# Patient Record
Sex: MALE | Race: ASIAN | Employment: FULL TIME | URBAN - METROPOLITAN AREA
[De-identification: names, ages, dates, MRNs, and addresses within clinical notes are randomized per-mention and may not be internally consistent; named-entity substitution may affect disease eponyms.]

---

## 2018-10-17 ENCOUNTER — APPOINTMENT (OUTPATIENT)
Dept: GENERAL RADIOLOGY | Facility: CLINIC | Age: 43
End: 2018-10-17
Attending: EMERGENCY MEDICINE
Payer: COMMERCIAL

## 2018-10-17 ENCOUNTER — HOSPITAL ENCOUNTER (INPATIENT)
Facility: CLINIC | Age: 43
LOS: 3 days | Discharge: HOME | End: 2018-10-20
Attending: HOSPITALIST | Admitting: INTERNAL MEDICINE
Payer: COMMERCIAL

## 2018-10-17 ENCOUNTER — HOSPITAL ENCOUNTER (EMERGENCY)
Facility: CLINIC | Age: 43
Discharge: ANOTHER HOSPITAL | End: 2018-10-17
Attending: EMERGENCY MEDICINE | Admitting: EMERGENCY MEDICINE
Payer: COMMERCIAL

## 2018-10-17 ENCOUNTER — APPOINTMENT (OUTPATIENT)
Dept: CARDIOLOGY | Facility: CLINIC | Age: 43
End: 2018-10-17
Attending: INTERNAL MEDICINE
Payer: COMMERCIAL

## 2018-10-17 VITALS
RESPIRATION RATE: 17 BRPM | HEIGHT: 65 IN | BODY MASS INDEX: 25.99 KG/M2 | OXYGEN SATURATION: 100 % | TEMPERATURE: 98.6 F | WEIGHT: 156 LBS | HEART RATE: 76 BPM | SYSTOLIC BLOOD PRESSURE: 107 MMHG | DIASTOLIC BLOOD PRESSURE: 63 MMHG

## 2018-10-17 DIAGNOSIS — I24.9 ACS (ACUTE CORONARY SYNDROME) (H): ICD-10-CM

## 2018-10-17 DIAGNOSIS — I21.4 NSTEMI (NON-ST ELEVATED MYOCARDIAL INFARCTION) (H): ICD-10-CM

## 2018-10-17 DIAGNOSIS — I23.8 PERICARDITIS AS COMPLICATION OF ACUTE MYOCARDIAL INFARCTION (H): ICD-10-CM

## 2018-10-17 DIAGNOSIS — I31.9 PERICARDITIS AS COMPLICATION OF ACUTE MYOCARDIAL INFARCTION (H): ICD-10-CM

## 2018-10-17 DIAGNOSIS — Z98.61 STATUS POST PERCUTANEOUS TRANSLUMINAL CORONARY ANGIOPLASTY: Primary | ICD-10-CM

## 2018-10-17 PROBLEM — I21.19: Status: ACTIVE | Noted: 2018-10-17

## 2018-10-17 PROBLEM — E74.39 GLUCOSE INTOLERANCE: Status: ACTIVE | Noted: 2018-10-17

## 2018-10-17 LAB
ANION GAP SERPL CALCULATED.3IONS-SCNC: 8 MMOL/L (ref 3–14)
BASOPHILS # BLD AUTO: 0 10E9/L (ref 0–0.2)
BASOPHILS NFR BLD AUTO: 0.2 %
BUN SERPL-MCNC: 14 MG/DL (ref 7–30)
CALCIUM SERPL-MCNC: 8.6 MG/DL (ref 8.5–10.1)
CHLORIDE SERPL-SCNC: 105 MMOL/L (ref 94–109)
CO2 SERPL-SCNC: 25 MMOL/L (ref 20–32)
CREAT SERPL-MCNC: 1.22 MG/DL (ref 0.66–1.25)
DIFFERENTIAL METHOD BLD: ABNORMAL
EOSINOPHIL # BLD AUTO: 0.1 10E9/L (ref 0–0.7)
EOSINOPHIL NFR BLD AUTO: 0.4 %
ERYTHROCYTE [DISTWIDTH] IN BLOOD BY AUTOMATED COUNT: 13.1 % (ref 10–15)
GFR SERPL CREATININE-BSD FRML MDRD: 64 ML/MIN/1.7M2
GLUCOSE BLDC GLUCOMTR-MCNC: 115 MG/DL (ref 70–99)
GLUCOSE BLDC GLUCOMTR-MCNC: 128 MG/DL (ref 70–99)
GLUCOSE BLDC GLUCOMTR-MCNC: 88 MG/DL (ref 70–99)
GLUCOSE SERPL-MCNC: 126 MG/DL (ref 70–99)
HBA1C MFR BLD: 5.9 % (ref 0–5.6)
HCT VFR BLD AUTO: 43.8 % (ref 40–53)
HGB BLD-MCNC: 14.7 G/DL (ref 13.3–17.7)
IMM GRANULOCYTES # BLD: 0 10E9/L (ref 0–0.4)
IMM GRANULOCYTES NFR BLD: 0.3 %
INTERPRETATION ECG - MUSE: NORMAL
LMWH PPP CHRO-ACNC: 0.31 IU/ML
LYMPHOCYTES # BLD AUTO: 1.3 10E9/L (ref 0.8–5.3)
LYMPHOCYTES NFR BLD AUTO: 11.2 %
MAGNESIUM SERPL-MCNC: 2.1 MG/DL (ref 1.6–2.3)
MCH RBC QN AUTO: 29.5 PG (ref 26.5–33)
MCHC RBC AUTO-ENTMCNC: 33.6 G/DL (ref 31.5–36.5)
MCV RBC AUTO: 88 FL (ref 78–100)
MONOCYTES # BLD AUTO: 0.8 10E9/L (ref 0–1.3)
MONOCYTES NFR BLD AUTO: 6.8 %
NEUTROPHILS # BLD AUTO: 9.7 10E9/L (ref 1.6–8.3)
NEUTROPHILS NFR BLD AUTO: 81.1 %
NRBC # BLD AUTO: 0 10*3/UL
NRBC BLD AUTO-RTO: 0 /100
PHOSPHATE SERPL-MCNC: 2.7 MG/DL (ref 2.5–4.5)
PLATELET # BLD AUTO: 236 10E9/L (ref 150–450)
POTASSIUM SERPL-SCNC: 3.9 MMOL/L (ref 3.4–5.3)
RBC # BLD AUTO: 4.98 10E12/L (ref 4.4–5.9)
SODIUM SERPL-SCNC: 138 MMOL/L (ref 133–144)
TROPONIN I SERPL-MCNC: 72.06 UG/L (ref 0–0.04)
TROPONIN I SERPL-MCNC: 83.22 UG/L (ref 0–0.04)
TROPONIN I SERPL-MCNC: 96.92 UG/L (ref 0–0.04)
WBC # BLD AUTO: 11.9 10E9/L (ref 4–11)

## 2018-10-17 PROCEDURE — 84100 ASSAY OF PHOSPHORUS: CPT | Performed by: NURSE PRACTITIONER

## 2018-10-17 PROCEDURE — 99221 1ST HOSP IP/OBS SF/LOW 40: CPT | Mod: AI | Performed by: INTERNAL MEDICINE

## 2018-10-17 PROCEDURE — 85520 HEPARIN ASSAY: CPT | Performed by: INTERNAL MEDICINE

## 2018-10-17 PROCEDURE — 83036 HEMOGLOBIN GLYCOSYLATED A1C: CPT | Performed by: NURSE PRACTITIONER

## 2018-10-17 PROCEDURE — 83735 ASSAY OF MAGNESIUM: CPT | Performed by: NURSE PRACTITIONER

## 2018-10-17 PROCEDURE — 96374 THER/PROPH/DIAG INJ IV PUSH: CPT

## 2018-10-17 PROCEDURE — 93005 ELECTROCARDIOGRAM TRACING: CPT | Mod: 76

## 2018-10-17 PROCEDURE — 25000132 ZZH RX MED GY IP 250 OP 250 PS 637: Performed by: INTERNAL MEDICINE

## 2018-10-17 PROCEDURE — 99223 1ST HOSP IP/OBS HIGH 75: CPT | Performed by: INTERNAL MEDICINE

## 2018-10-17 PROCEDURE — 99285 EMERGENCY DEPT VISIT HI MDM: CPT

## 2018-10-17 PROCEDURE — 71046 X-RAY EXAM CHEST 2 VIEWS: CPT

## 2018-10-17 PROCEDURE — 36415 COLL VENOUS BLD VENIPUNCTURE: CPT | Performed by: NURSE PRACTITIONER

## 2018-10-17 PROCEDURE — 25000132 ZZH RX MED GY IP 250 OP 250 PS 637: Performed by: EMERGENCY MEDICINE

## 2018-10-17 PROCEDURE — 93306 TTE W/DOPPLER COMPLETE: CPT | Mod: 26 | Performed by: INTERNAL MEDICINE

## 2018-10-17 PROCEDURE — 93005 ELECTROCARDIOGRAM TRACING: CPT

## 2018-10-17 PROCEDURE — 84484 ASSAY OF TROPONIN QUANT: CPT | Performed by: NURSE PRACTITIONER

## 2018-10-17 PROCEDURE — 84484 ASSAY OF TROPONIN QUANT: CPT | Performed by: EMERGENCY MEDICINE

## 2018-10-17 PROCEDURE — 99207 ZZC APP CREDIT; MD BILLING SHARED VISIT: CPT | Performed by: NURSE PRACTITIONER

## 2018-10-17 PROCEDURE — 85025 COMPLETE CBC W/AUTO DIFF WBC: CPT | Performed by: EMERGENCY MEDICINE

## 2018-10-17 PROCEDURE — 93306 TTE W/DOPPLER COMPLETE: CPT

## 2018-10-17 PROCEDURE — 99207 ZZC DOWN CODE DUE TO INITIAL EXAM: CPT | Performed by: INTERNAL MEDICINE

## 2018-10-17 PROCEDURE — 00000146 ZZHCL STATISTIC GLUCOSE BY METER IP

## 2018-10-17 PROCEDURE — 21000001 ZZH R&B HEART CARE

## 2018-10-17 PROCEDURE — 25000128 H RX IP 250 OP 636: Performed by: EMERGENCY MEDICINE

## 2018-10-17 PROCEDURE — 25000132 ZZH RX MED GY IP 250 OP 250 PS 637: Performed by: NURSE PRACTITIONER

## 2018-10-17 PROCEDURE — 25000128 H RX IP 250 OP 636: Performed by: NURSE PRACTITIONER

## 2018-10-17 PROCEDURE — 80048 BASIC METABOLIC PNL TOTAL CA: CPT | Performed by: EMERGENCY MEDICINE

## 2018-10-17 RX ORDER — POTASSIUM CHLORIDE 1500 MG/1
20-40 TABLET, EXTENDED RELEASE ORAL
Status: DISCONTINUED | OUTPATIENT
Start: 2018-10-17 | End: 2018-10-20 | Stop reason: HOSPADM

## 2018-10-17 RX ORDER — POTASSIUM CHLORIDE 29.8 MG/ML
20 INJECTION INTRAVENOUS
Status: DISCONTINUED | OUTPATIENT
Start: 2018-10-17 | End: 2018-10-20 | Stop reason: HOSPADM

## 2018-10-17 RX ORDER — ATORVASTATIN CALCIUM 40 MG/1
40 TABLET, FILM COATED ORAL EVERY EVENING
Status: DISCONTINUED | OUTPATIENT
Start: 2018-10-17 | End: 2018-10-20

## 2018-10-17 RX ORDER — ASPIRIN 81 MG/1
324 TABLET, CHEWABLE ORAL ONCE
Status: COMPLETED | OUTPATIENT
Start: 2018-10-17 | End: 2018-10-17

## 2018-10-17 RX ORDER — METOCLOPRAMIDE 5 MG/1
10 TABLET ORAL EVERY 6 HOURS PRN
Status: DISCONTINUED | OUTPATIENT
Start: 2018-10-17 | End: 2018-10-20 | Stop reason: HOSPADM

## 2018-10-17 RX ORDER — LIDOCAINE 40 MG/G
CREAM TOPICAL
Status: DISCONTINUED | OUTPATIENT
Start: 2018-10-17 | End: 2018-10-17

## 2018-10-17 RX ORDER — POTASSIUM CHLORIDE 1.5 G/1.58G
20-40 POWDER, FOR SOLUTION ORAL
Status: DISCONTINUED | OUTPATIENT
Start: 2018-10-17 | End: 2018-10-20 | Stop reason: HOSPADM

## 2018-10-17 RX ORDER — POLYETHYLENE GLYCOL 3350 17 G/17G
17 POWDER, FOR SOLUTION ORAL DAILY PRN
Status: DISCONTINUED | OUTPATIENT
Start: 2018-10-17 | End: 2018-10-20 | Stop reason: HOSPADM

## 2018-10-17 RX ORDER — CALCIUM CARBONATE 500 MG/1
1000 TABLET, CHEWABLE ORAL 4 TIMES DAILY PRN
Status: DISCONTINUED | OUTPATIENT
Start: 2018-10-17 | End: 2018-10-20 | Stop reason: HOSPADM

## 2018-10-17 RX ORDER — MORPHINE SULFATE 2 MG/ML
1 INJECTION, SOLUTION INTRAMUSCULAR; INTRAVENOUS
Status: DISCONTINUED | OUTPATIENT
Start: 2018-10-17 | End: 2018-10-20 | Stop reason: HOSPADM

## 2018-10-17 RX ORDER — MAGNESIUM SULFATE HEPTAHYDRATE 40 MG/ML
4 INJECTION, SOLUTION INTRAVENOUS EVERY 4 HOURS PRN
Status: DISCONTINUED | OUTPATIENT
Start: 2018-10-17 | End: 2018-10-20 | Stop reason: HOSPADM

## 2018-10-17 RX ORDER — ASPIRIN 325 MG
325 TABLET ORAL DAILY
Status: DISCONTINUED | OUTPATIENT
Start: 2018-10-18 | End: 2018-10-17

## 2018-10-17 RX ORDER — POTASSIUM CL/LIDO/0.9 % NACL 10MEQ/0.1L
10 INTRAVENOUS SOLUTION, PIGGYBACK (ML) INTRAVENOUS
Status: DISCONTINUED | OUTPATIENT
Start: 2018-10-17 | End: 2018-10-20 | Stop reason: HOSPADM

## 2018-10-17 RX ORDER — NICOTINE POLACRILEX 4 MG
15-30 LOZENGE BUCCAL
Status: DISCONTINUED | OUTPATIENT
Start: 2018-10-17 | End: 2018-10-20 | Stop reason: HOSPADM

## 2018-10-17 RX ORDER — ACETAMINOPHEN 325 MG/1
650 TABLET ORAL EVERY 4 HOURS PRN
Status: DISCONTINUED | OUTPATIENT
Start: 2018-10-17 | End: 2018-10-19

## 2018-10-17 RX ORDER — DEXTROSE MONOHYDRATE 25 G/50ML
25-50 INJECTION, SOLUTION INTRAVENOUS
Status: DISCONTINUED | OUTPATIENT
Start: 2018-10-17 | End: 2018-10-20 | Stop reason: HOSPADM

## 2018-10-17 RX ORDER — SODIUM CHLORIDE 9 MG/ML
INJECTION, SOLUTION INTRAVENOUS CONTINUOUS
Status: DISCONTINUED | OUTPATIENT
Start: 2018-10-18 | End: 2018-10-18 | Stop reason: HOSPADM

## 2018-10-17 RX ORDER — METOCLOPRAMIDE HYDROCHLORIDE 5 MG/ML
10 INJECTION INTRAMUSCULAR; INTRAVENOUS EVERY 6 HOURS PRN
Status: DISCONTINUED | OUTPATIENT
Start: 2018-10-17 | End: 2018-10-20 | Stop reason: HOSPADM

## 2018-10-17 RX ORDER — PROCHLORPERAZINE 25 MG
25 SUPPOSITORY, RECTAL RECTAL EVERY 12 HOURS PRN
Status: DISCONTINUED | OUTPATIENT
Start: 2018-10-17 | End: 2018-10-20 | Stop reason: HOSPADM

## 2018-10-17 RX ORDER — LIDOCAINE 40 MG/G
CREAM TOPICAL
Status: DISCONTINUED | OUTPATIENT
Start: 2018-10-17 | End: 2018-10-20 | Stop reason: HOSPADM

## 2018-10-17 RX ORDER — MAGNESIUM SULFATE HEPTAHYDRATE 40 MG/ML
2 INJECTION, SOLUTION INTRAVENOUS DAILY PRN
Status: DISCONTINUED | OUTPATIENT
Start: 2018-10-17 | End: 2018-10-20 | Stop reason: HOSPADM

## 2018-10-17 RX ORDER — ATORVASTATIN CALCIUM 10 MG/1
10 TABLET, FILM COATED ORAL EVERY EVENING
Status: ON HOLD | COMMUNITY
End: 2018-10-20

## 2018-10-17 RX ORDER — PROCHLORPERAZINE MALEATE 5 MG
10 TABLET ORAL EVERY 6 HOURS PRN
Status: DISCONTINUED | OUTPATIENT
Start: 2018-10-17 | End: 2018-10-20 | Stop reason: HOSPADM

## 2018-10-17 RX ORDER — AMOXICILLIN 250 MG
1 CAPSULE ORAL 2 TIMES DAILY PRN
Status: DISCONTINUED | OUTPATIENT
Start: 2018-10-17 | End: 2018-10-20 | Stop reason: HOSPADM

## 2018-10-17 RX ORDER — AMOXICILLIN 250 MG
2 CAPSULE ORAL 2 TIMES DAILY PRN
Status: DISCONTINUED | OUTPATIENT
Start: 2018-10-17 | End: 2018-10-20 | Stop reason: HOSPADM

## 2018-10-17 RX ORDER — ACETAMINOPHEN 650 MG/1
650 SUPPOSITORY RECTAL EVERY 4 HOURS PRN
Status: DISCONTINUED | OUTPATIENT
Start: 2018-10-17 | End: 2018-10-20 | Stop reason: HOSPADM

## 2018-10-17 RX ORDER — NALOXONE HYDROCHLORIDE 0.4 MG/ML
.1-.4 INJECTION, SOLUTION INTRAMUSCULAR; INTRAVENOUS; SUBCUTANEOUS
Status: DISCONTINUED | OUTPATIENT
Start: 2018-10-17 | End: 2018-10-20 | Stop reason: HOSPADM

## 2018-10-17 RX ORDER — POTASSIUM CHLORIDE 7.45 MG/ML
10 INJECTION INTRAVENOUS
Status: DISCONTINUED | OUTPATIENT
Start: 2018-10-17 | End: 2018-10-20 | Stop reason: HOSPADM

## 2018-10-17 RX ORDER — NITROGLYCERIN 0.4 MG/1
0.4 TABLET SUBLINGUAL EVERY 5 MIN PRN
Status: DISCONTINUED | OUTPATIENT
Start: 2018-10-17 | End: 2018-10-17 | Stop reason: HOSPADM

## 2018-10-17 RX ORDER — ALUMINA, MAGNESIA, AND SIMETHICONE 2400; 2400; 240 MG/30ML; MG/30ML; MG/30ML
30 SUSPENSION ORAL EVERY 4 HOURS PRN
Status: DISCONTINUED | OUTPATIENT
Start: 2018-10-17 | End: 2018-10-20 | Stop reason: HOSPADM

## 2018-10-17 RX ORDER — POTASSIUM CHLORIDE 1500 MG/1
20 TABLET, EXTENDED RELEASE ORAL
Status: DISCONTINUED | OUTPATIENT
Start: 2018-10-17 | End: 2018-10-18 | Stop reason: HOSPADM

## 2018-10-17 RX ORDER — ONDANSETRON 2 MG/ML
4 INJECTION INTRAMUSCULAR; INTRAVENOUS EVERY 6 HOURS PRN
Status: DISCONTINUED | OUTPATIENT
Start: 2018-10-17 | End: 2018-10-20 | Stop reason: HOSPADM

## 2018-10-17 RX ORDER — LORAZEPAM 0.5 MG/1
0.5 TABLET ORAL
Status: DISCONTINUED | OUTPATIENT
Start: 2018-10-17 | End: 2018-10-18 | Stop reason: HOSPADM

## 2018-10-17 RX ORDER — LORAZEPAM 2 MG/ML
0.5 INJECTION INTRAMUSCULAR
Status: DISCONTINUED | OUTPATIENT
Start: 2018-10-17 | End: 2018-10-18 | Stop reason: HOSPADM

## 2018-10-17 RX ORDER — FUROSEMIDE 10 MG/ML
20 INJECTION INTRAMUSCULAR; INTRAVENOUS EVERY 12 HOURS
Status: DISCONTINUED | OUTPATIENT
Start: 2018-10-17 | End: 2018-10-18 | Stop reason: ALTCHOICE

## 2018-10-17 RX ORDER — ONDANSETRON 4 MG/1
4 TABLET, ORALLY DISINTEGRATING ORAL EVERY 6 HOURS PRN
Status: DISCONTINUED | OUTPATIENT
Start: 2018-10-17 | End: 2018-10-20 | Stop reason: HOSPADM

## 2018-10-17 RX ADMIN — POTASSIUM CHLORIDE 20 MEQ: 1500 TABLET, EXTENDED RELEASE ORAL at 21:22

## 2018-10-17 RX ADMIN — HEPARIN SODIUM 800 UNITS/HR: 10000 INJECTION, SOLUTION INTRAVENOUS at 07:56

## 2018-10-17 RX ADMIN — Medication 3900 UNITS: at 07:58

## 2018-10-17 RX ADMIN — ASPIRIN 81 MG 324 MG: 81 TABLET ORAL at 05:36

## 2018-10-17 RX ADMIN — ASPIRIN 325 MG: 325 TABLET, DELAYED RELEASE ORAL at 12:23

## 2018-10-17 RX ADMIN — ACETAMINOPHEN 650 MG: 325 TABLET, FILM COATED ORAL at 11:08

## 2018-10-17 RX ADMIN — SODIUM CHLORIDE 500 ML: 9 INJECTION, SOLUTION INTRAVENOUS at 07:25

## 2018-10-17 RX ADMIN — FUROSEMIDE 20 MG: 10 INJECTION, SOLUTION INTRAVENOUS at 11:03

## 2018-10-17 RX ADMIN — ATORVASTATIN CALCIUM 40 MG: 40 TABLET, FILM COATED ORAL at 21:22

## 2018-10-17 RX ADMIN — HEPARIN SODIUM 800 UNITS/HR: 10000 INJECTION, SOLUTION INTRAVENOUS at 07:57

## 2018-10-17 RX ADMIN — ACETAMINOPHEN 650 MG: 325 TABLET, FILM COATED ORAL at 21:33

## 2018-10-17 RX ADMIN — ACETAMINOPHEN 650 MG: 325 TABLET, FILM COATED ORAL at 15:54

## 2018-10-17 ASSESSMENT — ENCOUNTER SYMPTOMS
CHEST TIGHTNESS: 1
FEVER: 0
ABDOMINAL PAIN: 0
CHILLS: 1
MYALGIAS: 1
DIAPHORESIS: 1
ARTHRALGIAS: 1
NAUSEA: 0
COUGH: 0
SHORTNESS OF BREATH: 1

## 2018-10-17 ASSESSMENT — ACTIVITIES OF DAILY LIVING (ADL)
ADLS_ACUITY_SCORE: 11

## 2018-10-17 ASSESSMENT — PAIN DESCRIPTION - DESCRIPTORS
DESCRIPTORS: HEADACHE
DESCRIPTORS: HEADACHE

## 2018-10-17 NOTE — IP AVS SNAPSHOT
MRN:[id number]                      After Visit Summary   10/17/2018    Neptali Duong    MRN: [id number]           Thank you!     Thank you for choosing [facility] for your care. Our goal is always to provide you with excellent care. Hearing back from our patients is one way we can continue to improve our services. Please take a few minutes to complete the written survey that you may receive in the mail after you visit with us. Thank you!        Patient Information     Date Of Birth          1975        Designated Caregiver       Most Recent Value    Caregiver    Will someone help with your care after discharge? yes    Name of designated caregiver elsa    Phone number of caregiver [phone]    Caregiver address [place], [place]      About your hospital stay     You were admitted on:  October 17, 2018 You last received care in the:  [facility] Coronary Care Unit    You were discharged on:  October 20, 2018       Who to Call     For medical emergencies, please call 911.  For non-urgent questions about your medical care, please call your primary care provider or clinic, None          Attending Provider     Provider Specialty    Timothy Sagastume MD Internal Medicine    Jose Maria Ash MD Internal Medicine       Primary Care Provider Fax #    Physician No Ref-Primary [phone]      Your next 10 appointments already scheduled     Oct 23, 2018 10:40 AM CDT   [facility] Izabela Keller MD   [facility]    [address]  [facility] [id number] [phone]            Oct 23, 2018 11:30 AM CDT   Cardiac Evaluation with Rh Cardiac Rehab 3   [facility] ([facility])    [address]  [facility] [id number] [phone]            Oct 26, 2018  9:00 AM CDT   LAB with RU LAB   [facility] [place] ([facility])    [address]  [facility] [address]   [phone]           Please do not eat 10-12 hours before your appointment if you are coming in fasting for labs on lipids, cholesterol, or glucose (sugar). This does not apply to pregnant women. Water, hot tea and black coffee (with nothing added) are okay. Do not drink other fluids, diet soda or chew gum.            Oct 26, 2018 10:00 AM CDT   Return Visit with Uma Longo PA-C   [facility] ([facility])    [address]  [facility] [address]   [phone]              Additional Services     CARDIAC REHAB REFERRAL       Patient may choose their preference of the site for Cardiac Rehab.            Follow-Up with Cardiac Advanced Practice Provider                 Future tests that were ordered for you     Basic metabolic panel                 Further instructions from your care team       Cardiac Angiogram Discharge Instructions - Femoral    After you go home:      Have an adult stay with you until tomorrow.    Drink extra fluids for 2 days.    You may resume your normal diet.    No smoking       For 24 hours - due to the sedation you received:    Relax and take it easy.    Do NOT make any important or legal decisions.    Do NOT drive or operate machines at home or at work.    Do NOT drink alcohol.    Care of Groin Puncture Site:      For the first 24 hrs - check the puncture site every 1-2 hours while awake.    For 2 days, when you cough, sneeze, laugh or move your bowels, hold your hand over the puncture site and press firmly.    Remove the bandaid after 24 hours. If there is minor oozing, apply another bandaid and remove it after 12 hours.    It is normal to have a small bruise or pea size lump at the site.    You may shower tomorrow. Do NOT take a bath, or use a hot tub or pool for at least 3 days. Do NOT scrub the site. Do not use lotion or powder near the puncture site.    Activity:             For 2 days:    No stooping or squatting    Do NOT do any heavy activity such as exercise, lifting, or straining.     No housework, yard work or any activity that make you sweat    Do NOT lift more than 10 pounds    Bleeding:      If you start bleeding from the site in your groin, lie down flat and press firmly on/above the site for 10 minutes.     Once bleeding stops, lay flat for 2 hours.     Call [facility] as soon as you can.       Call 911 right away if you have heavy bleeding or bleeding that does not stop.      Medicines:      If you are taking an antiplatelet medication such as Plavix, Brilinta or Effient, do not stop taking it until you talk to your cardiologist.      If you are on Metformin (Glucophage), do not restart it until you have blood tests (within 2 to 3 days after discharge).  After you have your blood drawn, you may restart the Metformin.     Take your medications, including blood thinners, unless your provider tells you not to.  If you take Coumadin (Warfarin), have your INR checked by your provider in  3-5 days. Call your clinic to schedule this.    If you have stopped any medicines, check with your provider about when to restart them.    Follow Up Appointments:      Follow up with [facility] Nurse Practitioner at [facility] of patient preference in 7-10 days.    Call the clinic if:      You have increased pain or a large or growing hard lump around the site.    The site is red, swollen, hot or tender.    Blood or fluid is draining from the site.    You have chills or a fever greater than 101 F (38 C).    Your leg feels numb, cool or changes color.    You have hives, a rash or unusual itching.    New pain in the back or belly that you cannot control with Tylenol.    Any questions or concerns.          [facility] [place]:    [phone] [facility] (7 days a week)            Pending Results     Date and Time Order Name Status Description    10/20/2018 0507 EKG  "12-lead, tracing only Preliminary     10/19/2018 1059 EKG 12-lead, tracing only Preliminary     10/18/2018 0816 Blood culture Preliminary     10/18/2018 0816 Blood culture Preliminary             Statement of Approval     Ordered          10/20/18 1512  I have reviewed and agree with all the recommendations and orders detailed in this document.  EFFECTIVE NOW     Approved and electronically signed by:  Earl Braswell MD             Admission Information     Date & Time Provider Department Dept. Phone    10/17/2018 Jose Maria Ash MD [facility] Coronary [facility] Unit [phone]      Your Vitals Were     Blood Pressure Pulse Temperature Respirations Height Weight    120/78 (BP Location: Right arm) 72 100.3  F (37.9  C) (Oral) 18 1.651 m (5' 5\") 69.4 kg (153 lb 1.6 oz)    Pulse Oximetry BMI (Body Mass Index)                96% 25.48 kg/m2          [facility] Information     [facility] lets you send messages to your doctor, view your test results, renew your prescriptions, schedule appointments and more. To sign up, go to [email or web address] . Click on \"Log in\" on the left side of the screen, which will take you to the Welcome page. Then click on \"Sign up Now\" on the right side of the page.     You will be asked to enter the access code listed below, as well as some personal information. Please follow the directions to create your username and password.     Your access code is: [email or web address]  Expires: 2019  2:38 PM     Your access code will  in 90 days. If you need help or a new code, please call your [facility] clinic or [phone].        Care EveryWhere ID     This is your Care EveryWhere ID. This could be used by other organizations to access your [facility] medical records  [id number]        Equal Access to Services     [facility] JO-ANN : Irma Quinteros, wacharisseda luqadaha, qaybta kaalmada liliana, abraham reynoso. So [facility] [phone].    ATENCIÓN: Si habla " español, tiene a boles disposición servicios gratuitos de asistencia lingüística. Maria Teresa yee [phone].    We comply with applicable federal civil rights laws and [place] laws. We do not discriminate on the basis of race, color, national origin, age, disability, sex, sexual orientation, or gender identity.               Review of your medicines      START taking        Dose / Directions    colchicine 0.6 MG tablet   Commonly known as:  COLCYRS   Used for:  Pericarditis as complication of acute myocardial infarction (H)        Dose:  0.6 mg   Take 1 tablet (0.6 mg) by mouth 2 times daily for 14 days   Quantity:  28 tablet   Refills:  0       metoprolol succinate 25 MG 24 hr tablet   Commonly known as:  TOPROL-XL   Used for:  Status post percutaneous transluminal coronary angioplasty        Dose:  25 mg   Take 1 tablet (25 mg) by mouth 2 times daily   Quantity:  60 tablet   Refills:  0       ticagrelor 90 MG tablet   Commonly known as:  BRILINTA   Used for:  Status post percutaneous transluminal coronary angioplasty   Notes to Patient:  You may adjust the times by an hour each dose until you reach your desired schedule, 12 hours between each dose        Dose:  90 mg   Take 1 tablet (90 mg) by mouth 2 times daily   Quantity:  60 tablet   Refills:  11         CONTINUE these medicines which may have CHANGED, or have new prescriptions. If we are uncertain of the size of tablets/capsules you have at home, strength may be listed as something that might have changed.        Dose / Directions    atorvastatin 10 MG tablet   Commonly known as:  LIPITOR   This may have changed:  how much to take        Dose:  20 mg   Take 2 tablets (20 mg) by mouth every evening   Quantity:  30 tablet   Refills:  0         CONTINUE these medicines which have NOT CHANGED        Dose / Directions    aspirin 81 MG tablet        Dose:  81 mg   Take 81 mg by mouth daily   Refills:  0            Where to get your medicines      These medications were  sent to [facility] [place], [place] - [address]  [address], [place] [address]     Phone:  [phone]     atorvastatin 10 MG tablet    colchicine 0.6 MG tablet    metoprolol succinate 25 MG 24 hr tablet    ticagrelor 90 MG tablet                Protect others around you: Learn how to safely use, store and throw away your medicines at www.disposemymeds.org.             Medication List: This is a list of all your medications and when to take them. Check marks below indicate your daily home schedule. Keep this list as a reference.      Medications           Morning Afternoon Evening Bedtime As Needed    aspirin 81 MG tablet   Take 81 mg by mouth daily   Next Dose Due:  Tomorrow morning 10/21/18            Tomorrow morning 10/21/18                       atorvastatin 10 MG tablet   Commonly known as:  LIPITOR   Take 2 tablets (20 mg) by mouth every evening   Last time this was given:  40 mg on 10/19/2018  8:08 PM   Next Dose Due:  Take tonight 10/20/18                    Take tonight 10/20/18               colchicine 0.6 MG tablet   Commonly known as:  COLCYRS   Take 1 tablet (0.6 mg) by mouth 2 times daily for 14 days   Last time this was given:  0.6 mg on 10/20/2018 10:51 AM   Next Dose Due:  Take tonight 10/20/18                           Take tonight 10/20/18           metoprolol succinate 25 MG 24 hr tablet   Commonly known as:  TOPROL-XL   Take 1 tablet (25 mg) by mouth 2 times daily   Last time this was given:  25 mg on 10/20/2018 10:52 AM   Next Dose Due:  Take tonight 10/20/18                           Take tonight 10/20/18           ticagrelor 90 MG tablet   Commonly known as:  BRILINTA   Take 1 tablet (90 mg) by mouth 2 times daily   Last time this was given:  90 mg on 10/20/2018  1:34 PM   Next Dose Due:  Take tonight 10/20/18 11pm   Notes to Patient:  You may adjust the times by an hour each dose until you reach your desired schedule, 12 hours between each dose                            Take tonight 10/20/18 11pm                     More Information      Safe Activities after a Heart Attack  After a heart attack, you may get tired more easily. It is important to listen to your body and get enough rest to help your heart heal quickly and safely. Below are some guidelines. Always follow the advice of your health care team. Call your doctor if you have questions.  After you go home, we suggest taking part in a cardiac rehabilitation program. Cardiac rehabilitation prepares you physically, mentally and emotionally for your return to work and normal life.  General guidelines  At first, you might feel different from day to day. On some days you may feel better and have more energy than others. Pace yourself and slowly return to activities.    Increase your daily activities, outings and exercise a little at a time.    Balance your activity with rest or quiet time. Spread activities out during the day and week.    Take rest breaks before you get too tired.    Watch for signs that you are doing too much:  ? Feeling very tired, weak, lightheaded or dizzy  ? Shortness of breath  ? Body aches  ? Taking a long time to recover from an activity    Avoid very hot showers, baths, whirlpools and saunas. Hot temperatures raise your heart rate, make your heart work harder and can make you feel dizzy or lightheaded.    Return to sexual activity when your doctor says your body is ready--and if you feel ready. If you can climb 2 flights of stairs quickly or walk at a pace of 3 miles per hour without symptoms, your body is likely ready to have sex.    Ask your doctor when it is OK for you to drive, return to work or take a long trip.  Activity guidelines  Recovery time after a heart attack is about 4 to 6 weeks. Your recovery may be shorter or longer. This is based on how much damage your heart has or if you have other health problems.  For the first 2 to 4 weeks  Lifting  Lift no more than 10 pounds. This  includes children and pets. Remember:    A gallon of milk weighs about 8 pounds.    A bag of groceries weighs about 10 to 12 pounds.    A full laundry basket weighs up to 25 pounds.  Activities  You may do light activities. Examples:  Self-care and household    Bathing, dressing    Light household tasks (making meals, washing dishes, dusting or laundry)    Computer work    Light gardening (weeding, flower planting)  Exercise and recreation    Walking (about 2 miles per hour)    Climbing stairs slowly    Slow biking    Short outings, light shopping    Light projects or hobbies, playing the "[employer] pool    Golf putting    Fishing from a dock  Wait at least 4 to 6 weeks  You may do light to moderate activities. Examples:  Chores    Moderate household tasks (vacuuming, sweeping, mopping, washing windows)    Wallpaper, painting    Mowing the lawn (power or self-propelled mower)    Raking, gardening (digging and spading)    Washing, waxing a car    General carpentry  Exercise and recreation    Medium to fast walking (about 3 to 3   miles per hour)    Leisurely biking, swimming, canoeing    Fishing from a boat, fly fishing    Bowling    Golfing (using a golf cart or rolling a bag of clubs)    Yoga, [employer]    Slow social dancing  6 to 8 weeks  You may start moderate to heavier activities. Talk with your therapist or doctor for guidelines about returning to heavy work or active sports.  Suggestions: _________________________________________________________________________________________________________________________________________________________________________________________________________________________________________________  For informational purposes only. Not to replace the advice of your health care provider.  Copyright   2013 [facility]. All rights reserved. CONEXANCE MD 943692 - REV 11/15.            Eating Heart-Healthy Foods  Eating has a big impact on your heart health. In fact,  eating healthier can improve several of your heart risks at once. For instance, it helps you manage weight, cholesterol, and blood pressure. Here are ideas to help you make heart-healthy changes without giving up all the foods and flavors you love.  Getting started    Talk with your healthcare provider about eating plans, such as the DASH or Mediterranean diet. You may also be referred to a dietitian.    Change a few things at a time. Give yourself time to get used to a few eating changes before adding more.    Work to create a tasty, healthy eating plan that you can stick to for the rest of your life.    Goals for healthy eating  Below are some tips to improve your eating habits:    Limit saturated fats and trans fats. Saturated fats raise your levels of cholesterol, so keep these fats to a minimum. They are found in foods such as fatty meats, whole milk, cheese, and palm and coconut oils. Avoid trans fats because they lower good cholesterol as well as raise bad cholesterol. Trans fats are most often found in processed foods.    Reduce sodium (salt) intake. Eating too much salt may increase your blood pressure. Limit your sodium intake to 2,300 milligrams (mg) per day (the amount in 1 teaspoon of salt), or less if your healthcare provider recommends it. Dining out less often and eating fewer processed foods are two great ways to decrease the amount of salt you consume.    Managing calories. A calorie is a unit of energy. Your body burns calories for fuel, but if you eat more calories than your body burns, the extras are stored as fat. Your healthcare provider can help you create a diet plan to manage your calories. This will likely include eating healthier foods as well as exercising regularly. To help you track your progress, keep a diary to record what you eat and how often you exercise.  Choose the right foods  Aim to make these foods staples of your diet. If you have diabetes, you may have different  recommendations than what is listed here:    Fruits and vegetables provide plenty of nutrients without a lot of calories. At meals, fill half your plate with these foods. Split the other half of your plate between whole grains and lean protein.    Whole grains are high in fiber and rich in vitamins and nutrients. Good choices include whole-wheat bread, pasta, and brown rice.    Lean proteins give you nutrition with less fat. Good choices include fish, skinless chicken, and beans.    Low-fat or nonfat dairy provides nutrients without a lot of fat. Try low-fat or nonfat milk, cheese, or yogurt.    Healthy fats can be good for you in small amounts. These are unsaturated fats, such as olive oil, nuts, and fish. Try to have at least 2 servings per week of fatty fish, such as salmon, sardines, mackerel, rainbow trout, and albacore tuna. These contain omega-3 fatty acids, which are good for your heart. Flaxseed is another source of a heart-healthy fat.  More on heart-healthy eating  Read food labels  Healthy eating starts at the grocery store. Be sure to pay attention to food labels on packaged foods. Look for products that are high in fiber and protein, and low in saturated fat, cholesterol, and sodium. Avoid products that contain trans fat. And pay close attention to serving size. For instance, if you plan to eat two servings, double all the numbers on the label.  Prepare food right  A key part of healthy cooking is cutting down on added fat and salt. Look on the internet for lower-fat, lower-sodium recipes. Also, try these tips:    Remove fat from meat and skin from poultry before cooking.    Skim fat from the surface of soups and sauces.    Broil, boil, bake, steam, grill, and microwave food without added fats.    Choose ingredients that spice up your food without adding calories, fat, or sodium. Try these items: horseradish, hot sauce, lemon, mustard, nonfat salad dressings, and vinegar. For salt-free herbs and  spices, try basil, cilantro, cinnamon, pepper, and rosemary.  Date Last Reviewed: 10/1/2017    8916-1033 [employer]. [address] [place] [address], [place] [address]. All rights reserved. This information is not intended as a substitute for professional medical care. Always follow your healthcare professional's instructions.                Atorvastatin tablets  Brand Name: Lipitor  What is this medicine?  ATORVASTATIN (a TORE va sta tin) is known as a HMG-CoA reductase inhibitor or 'statin'. It lowers the level of cholesterol and triglycerides in the blood. This drug may also reduce the risk of heart attack, stroke, or other health problems in patients with risk factors for heart disease. Diet and lifestyle changes are often used with this drug.  How should I use this medicine?  Take this medicine by mouth with a glass of water. Follow the directions on the prescription label. You can take this medicine with or without food. Take your doses at regular intervals. Do not take your medicine more often than directed.  Talk to your pediatrician regarding the use of this medicine in children. While this drug may be prescribed for children as young as 10 years old for selected conditions, precautions do apply.  What side effects may I notice from receiving this medicine?  Side effects that you should report to your doctor or health care professional as soon as possible:    allergic reactions like skin rash, itching or hives, swelling of the face, lips, or tongue    dark urine    fever    joint pain    muscle cramps, pain    redness, blistering, peeling or loosening of the skin, including inside the mouth    trouble passing urine or change in the amount of urine    unusually weak or tired    yellowing of eyes or skin  Side effects that usually do not require medical attention (report to your doctor or health care professional if they continue or are bothersome):    constipation    heartburn    stomach gas, pain,  upset  What may interact with this medicine?  Do not take this medicine with any of the following medications:    red yeast rice    telaprevir    telithromycin    voriconazole  This medicine may also interact with the following medications:    alcohol    antiviral medicines for HIV or AIDS    boceprevir    certain antibiotics like clarithromycin, erythromycin, troleandomycin    certain medicines for cholesterol like fenofibrate or gemfibrozil    cimetidine    clarithromycin    colchicine    cyclosporine    digoxin    female hormones, like estrogens or progestins and birth control pills    grapefruit juice    medicines for fungal infections like fluconazole, itraconazole, ketoconazole    niacin    rifampin    spironolactone  What if I miss a dose?  If you miss a dose, take it as soon as you can. If it is almost time for your next dose, take only that dose. Do not take double or extra doses.  Where should I keep my medicine?  Keep out of the reach of children.  Store at room temperature between 20 to 25 degrees C (68 to 77 degrees F). Throw away any unused medicine after the expiration date.  What should I tell my health care provider before I take this medicine?  They need to know if you have any of these conditions:    frequently drink alcoholic beverages    history of stroke, TIA    kidney disease    liver disease    muscle aches or weakness    other medical condition    an unusual or allergic reaction to atorvastatin, other medicines, foods, dyes, or preservatives    pregnant or trying to get pregnant    breast-feeding  What should I watch for while using this medicine?  Visit your doctor or health care professional for regular check-ups. You may need regular tests to make sure your liver is working properly.  Tell your doctor or health care professional right away if you get any unexplained muscle pain, tenderness, or weakness, especially if you also have a fever and tiredness. Your doctor or health care  professional may tell you to stop taking this medicine if you develop muscle problems. If your muscle problems do not go away after stopping this medicine, contact your health care professional.  This drug is only part of a total heart-health program. Your doctor or a dietician can suggest a low-cholesterol and low-fat diet to help. Avoid alcohol and smoking, and keep a proper exercise schedule.  Do not use this drug if you are pregnant or breast-feeding. Serious side effects to an unborn child or to an infant are possible. Talk to your doctor or pharmacist for more information.  This medicine may affect blood sugar levels. If you have diabetes, check with your doctor or health care professional before you change your diet or the dose of your diabetic medicine.  If you are going to have surgery tell your health care professional that you are taking this drug.  NOTE:This sheet is a summary. It may not cover all possible information. If you have questions about this medicine, talk to your doctor, pharmacist, or health care provider. Copyright  2018 [employer]

## 2018-10-17 NOTE — PHARMACY-ADMISSION MEDICATION HISTORY
Admission medication history interview status for the 10/17/2018  admission is complete. See EPIC admission navigator for prior to admission medications     Medication history source reliability:Good    Actions taken by pharmacist (provider contacted, etc):None     Additional medication history information not noted on PTA med list :None    Medication reconciliation/reorder completed by provider prior to medication history? No    Time spent in this activity: 10min    Prior to Admission medications    Medication Sig Last Dose Taking? Auth Provider   aspirin 81 MG tablet Take 81 mg by mouth daily 10/16/2018 at am Yes Unknown, Entered By History   atorvastatin (LIPITOR) 10 MG tablet Take 10 mg by mouth every evening 10/16/2018 at pm Yes Unknown, Entered By History

## 2018-10-17 NOTE — PLAN OF CARE
Problem: Patient Care Overview  Goal: Plan of Care/Patient Progress Review  OT/CR: Order rec'd however note patient likely headed for an angio. Holding eval until after.

## 2018-10-17 NOTE — ED NOTES
Pt c/o discomfort around his R PIV, heparin infusion was stopped. IV patency was checked by flushing and site was inspected. RN did not note any signs of IV malfunction. Heparin restarted.

## 2018-10-17 NOTE — ED PROVIDER NOTES
History     Chief Complaint:  Shortness of Breath    HPI   Neptali Duong is a 43 year old male with a history of pre-diabetes, and hyperlipidemia who presents to the ED with 3 days of left sided chest pain and shortness of breath. The patient reports that 3 days ago he developed left sided chest pain radiating into his left arm with generalized body aches. His pain worsens with exertion and has since been constant. However, as the patient is fairly active, he notes that he initially assumed that his pain was muscle related. Yet, because he has an extensive family history of heart disease, involving his brother who  at a young age as well as his father, he is increasingly concerned that this may be cardiac in etiology. In addition to this ongoing chest pain, the patient also endorses diaphoresis and shortness of breath, stating that he often has to stop to catch his breath when walking, which is unusual for him. He tends to feel short of breath even when laying flat on his back, and going about his day he feels as though he is a 10% of his regular capacity. Furthermore, the patient endorses fevers as he has felt intermittently chilled and diaphoretic, although he has not checked his temperature at home. He is afebrile in the ED today. Then tonight, the patient was unable to sleep due to these symptoms, prompting his visit to the ED. He has not yet taken aspirin today, which he states he takes regularly, and otherwise denies further acute symptoms such as nausea, abdominal pain or leg swelling.     CARDIAC RISK FACTORS:  Sex:    Male  Tobacco:   Negative  Hypertension:   Negative  Hyperlipidemia:  Positive  Diabetes:   (Pre-diabetes)  Family History:  Positive    Allergies:  No known drug allergies     Medications:    Simvastatin     Past Medical History:    Hyperlipidemia  Pre-diabetes     Past Surgical History:    History reviewed. No pertinent surgical history.    Family History:    Heart disease    Social  "History:  The patient was not accompanied to the ED.  Smoking Status: Never  Smokeless Tobacco: Not on file  Alcohol Use: Not on file  Marital Status:       Review of Systems   Constitutional: Positive for chills and diaphoresis. Negative for fever.   Respiratory: Positive for chest tightness and shortness of breath. Negative for cough.    Cardiovascular: Positive for chest pain. Negative for leg swelling.   Gastrointestinal: Negative for abdominal pain and nausea.   Musculoskeletal: Positive for arthralgias and myalgias.   All other systems reviewed and are negative.    Physical Exam     Patient Vitals for the past 24 hrs:   BP Temp Temp src Pulse Heart Rate Resp SpO2 Height Weight   10/17/18 0540 - - - - - - 99 % - -   10/17/18 0539 - - - - - - 99 % - -   10/17/18 0538 (!) 82/54 - - - - - 99 % - -   10/17/18 0537 - - - - - - 100 % - -   10/17/18 0536 - - - - - - 99 % - -   10/17/18 0535 - - - - - - 100 % - -   10/17/18 0534 - - - - - - 99 % - -   10/17/18 0533 - - - - - - 97 % - -   10/17/18 0532 - - - - - - 99 % - -   10/17/18 0531 - - - - - - 99 % - -   10/17/18 0530 (!) 85/57 - - - - - 99 % - -   10/17/18 0500 108/71 - - - - - - - -   10/17/18 0446 101/65 98.6  F (37  C) Oral 77 77 16 98 % 1.651 m (5' 5\") 63.5 kg (140 lb)     Physical Exam  Nursing note and vitals reviewed.  Constitutional: Cooperative.   HENT:   Mouth/Throat: Moist mucous membranes.   Eyes: EOMI, nonicteric sclera  Cardiovascular: Normal rate, regular rhythm, no murmurs, rubs, or gallops.  No pain to palpation of chest  Pulmonary/Chest: Effort normal and breath sounds normal. No respiratory distress. No wheezes. No rales.   Abdominal: Soft. Nontender, nondistended, no guarding or rigidity. BS present.   Musculoskeletal: Normal range of motion.   Neurological: Alert. Moves all extremities spontaneously.   Skin: Skin is warm and dry. No rash noted.   Psychiatric: Normal mood and affect.     Emergency Department Course     ECG " (04:53:09):  Rate 74 bpm. TN interval 136. QRS duration 90. QT/QTc 362/401. P-R-T axes 62 -15 43. Normal sinus rhythm. Increased R/S ratio in V1, consider early transition or posterior infarct. Abnormal ECG. Interpreted at 0456 by Akira Her MD.    Posterior ECG (05:14:40):  Rate 81 bpm. TN interval 130. QRS duration 70. QT/QTc 352/408. P-R-T axes 54 -21 25. Normal sinus rhythm. Abnormal ECG.     No posterior MI** Interpreted at 0517 by Akira Her MD.    ECG (06:40:15):  Rate 75 bpm. TN interval 138. QRS duration 90. QT/QTc 360/402. P-R-T axes 67 -31 11. Normal sinus rhythm. Left axis deviation. Nonspecific ST abnormality. Abnormal ECG. Interpreted at 0645 by Akira Her MD.    Imaging:  Radiology findings were communicated with the patient who voiced understanding of the findings.  Chest XR, PA & LAT:  No evidence of active cardiopulmonary disease.    As read by radiology    Laboratory:  Laboratory findings were communicated with the patient who voiced understanding of the findings.    CBC: WBC 11.9 (H), o/w WNL (HGB 14.7, )  BMP: Glucose 126 (H), o/w WNL (Creatinine 1.22)    Troponin (Collected 0526): 96.916 (HH)    Interventions:  0536 - Aspirin tablet 324 mg PO  Heparin drip  Medications   nitroGLYcerin (NITROSTAT) sublingual tablet 0.4 mg (not administered)   0.9% sodium chloride BOLUS (not administered)   aspirin chewable tablet 324 mg (324 mg Oral Given 10/17/18 0536)        Emergency Department Course:  Nursing notes and vitals reviewed.    IV was inserted and blood was drawn for laboratory testing, results above.    The patient was sent for a chest x-ray while in the emergency department, results above.     0459: I performed an exam of the patient as documented above.   0628: Discussed results with lab regarding abnormal findings. Troponin of 96 noted.   0634: Patient rechecked and updated.   0638: I spoke with Dr. Morton of the cardiology service regarding patient's  presentation, findings, and plan of care.  0642: Discussed plan for transfer to [facility] with patient.   0645: I spoke with Dr. Sagastume of the hospitalist service from [facility] regarding patient's presentation, findings, and plan of care.    Findings and plan explained to the Patient.    Patient will be transferred to [facility] via EMS. Discussed the case with Dr. Sagastume, who will admit the patient to a monitored bed for further monitoring, evaluation, and treatment.     Impression & Plan      Medical Decision Making:  Patient presents with chief complaint chest pain times 3 days. The DDx of the patients chest pain includes ACS, angina, pericarditis, PE, pneumonia, pleuritis, dissection, aneurysmal disease, GERD, esophageal spasm, costochronditis/chest wall pain, etc as the most likely etiologies.  Aspirin given.  EKG shows ST depression in leads V1 through V4.  A posterior EKG was done which does not show any elevation of v7-v9.  Patient is currently pain-free.  He did transiently have several low blood pressures, but these improved.  Troponin is significantly elevated.  This raises concern for cardiogenic shock.  No nitro given for this reason.  Heparin initiated per protocol.  Presumptive diagnosis is NSTEMI.  May also be myocarditis given patient's history of subjective fevers and chills and body aches over the last several days.  Discussed with cardiology, Dr. Morton, who recommended transfer to [facility] given we do not have Cath Lab availability until this afternoon.  Discussed with Dr. Sagastume at [facility] who accepts for transfer.  Patient is in agreement this plan.  He will be transferred by EMS pending bed availability.      Diagnosis:    ICD-10-CM    1. NSTEMI (non-ST elevated myocardial infarction) (H) I21.4        Disposition:  Transferred to [facility]      Marilyn Diaz  10/17/2018   [facility] EMERGENCY DEPARTMENT  I, Marilyn Diaz am serving as a scribe at 4:59 AM on 10/17/2018  to document services personally performed by Akira Her MD based on my observations and the provider's statements to me.       Akira Her MD  10/17/18 0735

## 2018-10-17 NOTE — CONSULTS
"[facility]    Cardiology Consultation     Date of Admission:  10/17/2018  Date of Consult (When I saw the patient): 10/17/18    Assessment & Plan   Neptali Duong is a 43 year old male who was admitted on 10/17/2018.    1-acute myocardial infarction 48-60 hours ago.  Ischemic symptoms resolved more than 24 hours ago.  He has been having some congestive heart failure since then.  Echo shows extensive inferolateral hypokinesis and some inferior hypokinesis.  Ejection fraction only mildly decreased.  Initial troponin 97.  Will get follow-up troponin to assess evidence of rising or is decreasing    2-congestive heart failure, secondary to acute MI.  Improving.  Will add diuretic.    3-marked family history of premature coronary disease with brother  of MI in his mid 30s, and father having his first ischemic symptoms before age 50.    4.-Dyslipidemia, on statin therapy that he describes as Lipitor 10 mg.  He states his numbers are \"good\" on this but I do not have those values and they are not available to me through epic    5-there is some chronic renal insufficiency present with creatinine of 1.2 to.        Recommendation  1-aspirin and anticoagulation.  2-I will get further troponins  3-likely will want to intensify statin therapy  4-we will do some diuresis and then add beta-blocker  5.-Proceed with coronary angiography this admission given his marked family history of premature coronary disease and potential for residual viable myocardium in the inferolateral wall as well as to assess for other severe coronary disease.  He has a complaint of chronic dyspnea on exertion which is somewhat limiting which may be an ischemic symptom.        Sammy Sorenson M.D.    Primary Care Physician   Physician No Ref-Primary    Reason for Consult   Reason for consult: I was asked by Hannah Kinney to evaluate this patient for chest pain and abnormal troponins.    History of Present Illness   Neptali Duong is a 43 year old male " who presents with complaints of shortness of breath with recent history of chest pain.  He notes the onset of marked heartburn-like symptoms 48-60 hours prior to admission.  This progressed some shortness of breath and he felt some achiness particularly his left shoulder and left arm but that he had some general aches along with a thought maybe he had overdone it.  He is vague about how long the pain lasted versus a worsening shortness of breath but heartburn appears to resolved at least 24 hours ago.  However the last 2 nights he has had worsening orthopnea and dyspnea on exertion and so presented to the emergency room this morning.  He states he is no longer having any heartburn or achiness in his chest arm or shoulder.  His main complaint has been his difficulty breathing.  After initial management he is more comfortable lying down and is not in respiratory distress.  EKG shows prominent R waves in V1 through V3 with ST depression and would be consistent with a recent or acute posterior MI.  Initial troponin is essentially 97.  I had an urgent echocardiogram done which demonstrated a large inferolateral and some inferior regional wall motion abnormality with mildly decreased ejection fraction.  Right ventricular function low normal.  Global ejection fraction 45% estimate.  No significant valvular disease  Prior to the onset of the symptoms he states only noted would be dyspnea on exertion which would limit him when he was working out.  He is vague about whether he thought this was more than he should expect but did seem to think this was unusual.  He has had no bleeding episodes, previous orthopnea or PND, edema, palpitations dizziness or syncope.  No bleeding history.  Has treated dyslipidemia.  Denies hypertension or diabetes.  No smoking.  Brother had an MI and  from it in his mid 30s, father developed symptomatic ischemia around or before the age of 50.  History of diabetes and hypertension and another  "brother.    Patient Active Problem List   Diagnosis     ACS (acute coronary syndrome) (H)       Past Medical History   I have reviewed this patient's medical history and updated it with pertinent information if needed.   Past Medical History:   Diagnosis Date     Glucose intolerance      Hyperlipidemia        Past Surgical History   I have reviewed this patient's surgical history and updated it with pertinent information if needed.  Past Surgical History:   Procedure Laterality Date     NO HISTORY OF SURGERY         Prior to Admission Medications   None     Current Facility-Administered Medications   Medication Dose Route Frequency     [START ON 10/18/2018] aspirin  325 mg Oral Daily     furosemide  20 mg Intravenous Q12H     insulin aspart  1-4 Units Subcutaneous Q4H     sodium chloride (PF)  3 mL Intracatheter Q8H     Current Facility-Administered Medications   Medication Last Rate     - MEDICATION INSTRUCTIONS -       HEParin 800 Units/hr (10/17/18 1103)     - MEDICATION INSTRUCTIONS -       - MEDICATION INSTRUCTIONS -       Reason ACE/ARB/ARNI order not selected       Reason beta blocker order not selected       Allergies   No Known Allergies    Social History    reports that he has never smoked. He does not have any smokeless tobacco history on file.    Family History   Family History   Problem Relation Age of Onset     Coronary Artery Disease Father      Myocardial Infarction Brother        Review of Systems   The 10 point Review of Systems is negative other than noted in the HPI or here.       Physical Exam   Vital Signs with Ranges  Temp:  [98.6  F (37  C)] 98.6  F (37  C)  Pulse:  [76-77] 76  Heart Rate:  [69-84] 84  Resp:  [15-20] 20  BP: ()/(51-87) 105/63  SpO2:  [97 %-100 %] 100 %  Vitals:    10/17/18 1000   Weight: 69.9 kg (154 lb 2.1 oz)          Vitals: /63 (BP Location: Left arm)  Resp 20  Ht 1.651 m (5' 5\")  Wt 69.9 kg (154 lb 2.1 oz)  SpO2 100%  BMI 25.65 kg/m2    Constitutional: " Resting comfortably no acute distress and no respiratory distress    Skin: Clear    Head/Eyes/ENT:   Mallampati 2.  No cyanosis, icterus, pallor    Neck: Supple.  Normal carotid upstrokes without bruit    Chest: Clear    Cardiac: No murmur rub gallop fever JVD    Abdomen:   Soft, nontender, no mass or bruit    Vascular: Radial femoral and pedal pulses intact and symmetrical    Extremities and Back:   No cyanosis, erythema, edema    Neurological:   Alert and oriented x3.  Pupils equal.  Extraocular motions intact, speech fluent, facies symmetrical.  Upper and lower motor strength symmetrical grossly normal and nonfocal      Recent Labs  Lab 10/17/18  0526   TROPI 96.916*         Recent Labs  Lab 10/17/18  0526   WBC 11.9*   HGB 14.7   MCV 88         POTASSIUM 3.9   CHLORIDE 105   CO2 25   BUN 14   CR 1.22   GFRESTIMATED 64   GFRESTBLACK 78   ANIONGAP 8   THOMAS 8.6   *   TROPI 96.916*       Imaging:  Recent Results (from the past 48 hour(s))   Chest XR,  PA & LAT    Narrative    CHEST 2 VIEWS  10/17/2018 6:03 AM     HISTORY: Chest pain.    COMPARISON: None.    FINDINGS: The lungs are clear. Normal-sized cardiac silhouette.      Impression    IMPRESSION: No evidence of active cardiopulmonary disease.    SHRUTI FRIED MD       Echo:  Recent Results (from the past 4320 hour(s))   Echocardiogram Complete    Narrative    [id number]  [facility]19  [id number]  [id number]^ROYER^OSVALDO^[facility]  Echocardiography Laboratory  [address] [place] [address]        Name: JESSICA ST  MRN: [id number]  : 1975  Study Date: 10/17/2018 10:02 AM  Age: 43 yrs  Gender: Male  Patient Location: [facility]  Reason For Study: Acute Myocardial Infarction  Ordering Physician: OSVALDO LINTON  Referring Physician: MARIEL PMD  Performed By: Aki Maldonado RDCS     BSA: 1.8 m2  Height: 65 in  Weight: 156 lb  HR: 79  BP: 107/63  mmHg  _____________________________________________________________________________  __        Procedure  Complete Portable Echo Adult.  _____________________________________________________________________________  __        Interpretation Summary     1. The left ventricle is normal in size. The visual ejection fraction is  estimated at 50%. There is mild inferolateral wall hypokinesis.  2. The right ventricle is normal in structure, function and size.  3. No significant valve disease.     No previous echo for comparison.  _____________________________________________________________________________  __        Left Ventricle  The left ventricle is normal in size. There is normal left ventricular wall  thickness. The visual ejection fraction is estimated at 50%. Left ventricular  diastolic function is normal. There is mild inferolateral wall hypokinesis.     Right Ventricle  The right ventricle is normal in structure, function and size.     Atria  Normal left atrial size. Right atrial size is normal. There is no atrial shunt  seen.     Mitral Valve  There is mild (1+) mitral regurgitation.        Tricuspid Valve  There is mild (1+) tricuspid regurgitation. The right ventricular systolic  pressure is approximated at 35.7 mmHg plus the right atrial pressure.     Aortic Valve  The aortic valve is normal in structure and function.     Pulmonic Valve  The pulmonic valve is normal in structure and function.     Vessels  Normal ascending, transverse (arch), and descending aorta. The IVC is normal  in size and reactivity with respiration, suggesting normal central venous  pressure.     Pericardium  There is no pericardial effusion.        Rhythm  Sinus rhythm was noted.  _____________________________________________________________________________  __  MMode/2D Measurements & Calculations  IVSd: 0.96 cm     LVIDd: 5.0 cm  LVIDs: 4.0 cm  LVPWd: 0.78 cm  FS: 18.7 %  LV mass(C)d: 149.2 grams  LV mass(C)dI: 83.8 grams/m2  Ao  root diam: 3.1 cm  LA dimension: 3.8 cm  asc Aorta Diam: 3.1 cm  LA/Ao: 1.2  LA Volume (BP): 56.7 ml  LA Volume Index (BP): 31.9 ml/m2  RWT: 0.31           Doppler Measurements & Calculations  MV E max milagros: 64.6 cm/sec  MV A max milagros: 33.7 cm/sec  MV E/A: 1.9  MV dec time: 0.24 sec  PA acc time: 0.13 sec  TR max milagros: 298.6 cm/sec  TR max P.7 mmHg  E/E' av.2  Lateral E/e': 6.2  Medial E/e': 6.3           _____________________________________________________________________________  __           Report approved by: Miguel A Beasley 10/17/2018 10:55 AM

## 2018-10-17 NOTE — H&P
"Admitted:     10/17/2018      CHIEF COMPLAINT:  Shortness of breath.      HISTORY OF PRESENT ILLNESS:  Mr. Duong is a 43-year-old gentleman with hyperlipidemia and prediabetes who on 10/15/2018 initially had some heartburn-like symptoms.  He could not sleep that night and there was nausea and vomiting x1. The next morning, 10/16/18 he was having shortness of breath.  The shortness of breath was nearly constant.  It was worse with any kind of exertion and supine positioning.  Patient noted that as his shortness of breath progressed in severity, he developed some pleuritic type chest pain as well.  He would become diaphoretic with walking and had relief when his head was elevated and he was in an upright position.  He felt like he was taking \"empty breaths,\" he was so dyspneic.  Additionally, he had some body aches, most significant in the left upper extremity, although there was some in the right upper extremitity as well.  This has been going on for approximately 3 days now.  The patient's wife recently had a flu-like illness with body aches, headache, and fever.  He also has been having 3 days of headaches, mild cough, no mucus production, and 1 episode of diarrhea earlier this week.  Because he thought he had influenza despite having received the vaccine 2 weeks ago, patient sought treatment at [facility].        In the ED, the patient shared a family history of a brother  at age 35 of CAD, father  from CAD and diabetes with initial onset of CAD at age 50.  Mother also had CAD with her onset at 68.  The patient received 324 mg of aspirin in the ED.  Sublingual nitro was not administered because of blood pressures as low as 82/51 and instead he was given a 500 mL IV fluid bolus.  Chest x-ray was clear without infiltrates.  EKG showed Q-waves in V5 and V6 and V2-V4, ST depression with a left axis, albeit normal sinus rhythm and normal intervals.      LABORATORY DATA:  Showed a WBC count " of 11.9, hemoglobin of 14.7, creatinine of 1.2, otherwise normal electrolytes.  Troponin was 96.6.  There was concern for NSTEMI with need for urgent coronary angiogram because of ongoing anginal equivalent with the arm discomfort and the shortness of breath.  Because of potential earlier cath lab availability, transfer to [facility] was recommended.  Hospitalist Service has been asked to assume care of the patient upon arrival here.       Since being in the Emergency Department, where he was also initiated on heparin infusion, he feels like his shortness of breath is slightly better now, just 1/10, in terms of severity and arm discomfort.  Cardiology had already been notified of his arrival by the time I arrived to see the patient as well.  A stat echo has been started.  I was able to coordinate the plan of care with the cardiologist at the bedside for a preliminary plan.      PAST MEDICAL HISTORY:   1.  Hyperlipidemia.   2.  Prediabetes.      PAST SURGICAL HISTORY:  None.      ALLERGIES:  NONE.  HE JUST AVOIDS MILK AND BUTTER AND PORK.      SOCIAL HISTORY:  The patient is  with 2 children, ages 7 and 12.  He works out 4 times a week.  He is a never smoker, rare alcohol use, nondrug user.  Works in information technology.      FAMILY HISTORY:  As per HPI.      OUTPATIENT MEDICATIONS:    Lipitor 10 mg p.o. daily.   Aspirin 81 mg daily     REVIEW OF SYSTEMS:   CONSTITUTIONAL:  Reports some subjective fevers and chills for which he took acetaminophen.  Remainder as per HPI.   CARDIOVASCULAR:  As per HPI.  Also, specifically denies any palpitations, but did endorse some presyncope sensation when he was having the arm discomfort and shortness of breath.   PULMONARY:  As per HPI.   HEENT:  Reports a runny nose but no rhinorrhea, no sore throat.   GASTROINTESTINAL:  As per HPI.  Has had some anorexia this week.   GENITOURINARY:  Negative for any dysuria.   INTEGUMENT:  Negative for any acute skin changes.    ENDOCRINE:  Checks his blood sugar about 1 time every 3 months.  Reports readings anywhere from 102 to nearly 200 depending on if it is fasting or random.   MUSCULOSKELETAL:  Myalgias, as per HPI.   NEUROLOGIC:  As per HPI.  No acute vision changes or trauma.      PHYSICAL EXAMINATION:   GENERAL:  Young man, appears stated age, sitting in bed without acute distress.   HEENT:  Mouth has moist oral mucosa.   NECK:  Supple.   CARDIOVASCULAR:  S1, S2, no murmurs.  Normotensive with /61, heart rate is 82.   LUNGS:  Clear to auscultation, bilateral upper lobes, 97% saturations on room air.   ABDOMEN:  Normoactive bowel sounds, soft.   EXTREMITIES:  Without edema.     CNS:  Face symmetric.  Tongue is midline.  Follows commands to show 2 fingers and wiggle feet bilaterally.   Exam is somewhat limited as patient was undergoing an echocardiogram at the time    ASSESSMENT:  Mr. Duong is a 43-year-old gentleman with past medical history of hyperlipidemia, prediabetes, and strong family history of CAD with onset at a very young age, who is presenting with a likely late presentation of NSTEMI in the inferolateral distribution.      PROBLEM LIST AND PLAN:     Inferolateral late presentation NSTEMI manifesting primarily with dyspnea, dyspepsia and left upper extremity discomfort.  No specific chest pain, per se.  Initial troponin is 96.9.   -- Consult Cardiology, with whom we have discussed preliminary management plan. Appreciate their brisk involvement.   -- Continue daily aspirin.   -- continue heparin infusion.   -- We will gently diurese patient.   -- Defer to Cardiology timing for angiography, which they are thinking to do electively.   -- Will continue to trend troponins, monitoring for a peak.   -- Echo already obtained by the Cardiology team.   -- We will also provide p.r.n. morphine.   -- Given his low normal blood pressures, we will hold off on any nitroglycerin for now.   -- The patient will be kept n.p.o. today  in the event that coronary angiogram is pursued this afternoon.   -- Low-fat, low-cholesterol, 2-gram sodium diet can be resumed post-angiography.   -- Check a fasting lipid profile in the morning.   --increase lipitor dose tonight to 40mg daily    Prediabetes.  Euglycemic thus far 88-126mg/dL  -- we will start patient on a sliding scale insulin and check an A1c if not done within the last 3 months.     Lines  -- one 18-gauge catheter.     Prophylaxis:    -- PCDs and heparin infusion.       I have discussed code status with the patient.  He desires full resuscitative measures.      The patient will be independently evaluated by Dr. Jose Maria Ash.        Total time is 52 minutes of which 20 minutes was face-to-face time remainder spent in counseling coronation of        JOSE MARIA ASH MD       As dictated by BRANDAN PIMENTEL, BRENDON, CNP            D: 10/17/2018   T: 10/17/2018   MT: FLOYD      Name:     JESSICA ST   MRN:      [id number]        Account:      [id number]   :      1975        Admitted:     10/17/2018                   Document: [id number]

## 2018-10-17 NOTE — ED TRIAGE NOTES
Patient here for sob, midsternal chest pain, and left arm pain ongoing for 2 days. Felt worst tonight while laying down on bed. ABCs intact, gcs 15, AA&Ox3.

## 2018-10-17 NOTE — PROGRESS NOTES
Physician Attestation   I, Jose Maria Ash, saw and evaluated Neptali Duong as part of a shared visit.  I have reviewed and discussed with the advanced practice provider their history, physical and plan.    I personally reviewed the vital signs, medications, labs and imaging.    My key history or physical exam findings: Presented to [facility] initially, initial symptoms were dyspepsia-like chest pain, as well as generalized body aches, chills and worsening dyspnea on exertion. Wife recently ill with flu-like symptoms. Noted to have troponin of 96.916, ST depressions in V2-V4. Cath lab not available at [facility] until afternoon and felt to need more urgent catheterization, so transferred to [facility] for more urgent cardiac assessment/intervention. At present denies any further chest pain, but does report some shortness of breath particularly when lying flat. On exam appears comfortable at rest. Heart regular. Lungs clear.     Key management decisions made by me:  NSTEMI  Ddx includes ACS / type 1 MI as well as myocarditis (body aches, chills). Does have family history of early heart disease (brother  at 35). Anti-anginal therapy limited by borderline blood pressure at [facility].   - Cardiology consulted, appreciate assistance. Anticipate will need angiogram, timing per cardiology.   - Echocardiogram pending  - Continue heparin drip, aspirin  - Trend troponin   - Continue atorvastatin  - Observe BP trend prior to beta blocker, ACE-I   - HgbA1c    Jose Maria Ash  Date of Service (when I saw the patient): 10/17/18

## 2018-10-17 NOTE — PLAN OF CARE
Problem: Patient Care Overview  Goal: Plan of Care/Patient Progress Review  Outcome: No Change  Pt c/o headache, relieved by tylenol. Vitals stable on RA. BP's soft. SBA. Tele SR/SB.  Heparin 800 units/hr. Following trops, 10a rechecks. Diuresing, Plan for angio tomorrow. NPO at midnight. Continue to monitor.

## 2018-10-17 NOTE — IP AVS SNAPSHOT
[facility]    [address]    [place] [address]    Phone:  [phone]                                       After Visit Summary   10/17/2018    Neptali Duong    MRN: [id number]           After Visit Summary Signature Page     I have received my discharge instructions, and my questions have been answered. I have discussed any challenges I see with this plan with the nurse or doctor.    ..........................................................................................................................................  Patient/Patient Representative Signature      ..........................................................................................................................................  Patient Representative Print Name and Relationship to Patient    ..................................................               ................................................  Date                                   Time    ..........................................................................................................................................  Reviewed by Signature/Title    ...................................................              ..............................................  Date                                               Time          22EPIC Rev 08/18

## 2018-10-18 ENCOUNTER — APPOINTMENT (OUTPATIENT)
Dept: CARDIOLOGY | Facility: CLINIC | Age: 43
End: 2018-10-18
Attending: INTERNAL MEDICINE
Payer: COMMERCIAL

## 2018-10-18 LAB
ALBUMIN SERPL-MCNC: 3.4 G/DL (ref 3.4–5)
ALBUMIN UR-MCNC: 30 MG/DL
ALP SERPL-CCNC: 66 U/L (ref 40–150)
ALT SERPL W P-5'-P-CCNC: 72 U/L (ref 0–70)
ANION GAP SERPL CALCULATED.3IONS-SCNC: 9 MMOL/L (ref 3–14)
APPEARANCE UR: CLEAR
AST SERPL W P-5'-P-CCNC: 276 U/L (ref 0–45)
BILIRUB DIRECT SERPL-MCNC: 0.4 MG/DL (ref 0–0.2)
BILIRUB SERPL-MCNC: 1.3 MG/DL (ref 0.2–1.3)
BILIRUB UR QL STRIP: NEGATIVE
BUN SERPL-MCNC: 17 MG/DL (ref 7–30)
CALCIUM SERPL-MCNC: 8.5 MG/DL (ref 8.5–10.1)
CHLORIDE SERPL-SCNC: 107 MMOL/L (ref 94–109)
CHOLEST SERPL-MCNC: 131 MG/DL
CO2 SERPL-SCNC: 23 MMOL/L (ref 20–32)
COLOR UR AUTO: YELLOW
CREAT SERPL-MCNC: 1.15 MG/DL (ref 0.66–1.25)
ERYTHROCYTE [DISTWIDTH] IN BLOOD BY AUTOMATED COUNT: 13.4 % (ref 10–15)
FLUAV+FLUBV RNA SPEC QL NAA+PROBE: NEGATIVE
FLUAV+FLUBV RNA SPEC QL NAA+PROBE: NEGATIVE
GFR SERPL CREATININE-BSD FRML MDRD: 69 ML/MIN/1.7M2
GLUCOSE BLDC GLUCOMTR-MCNC: 110 MG/DL (ref 70–99)
GLUCOSE BLDC GLUCOMTR-MCNC: 111 MG/DL (ref 70–99)
GLUCOSE BLDC GLUCOMTR-MCNC: 111 MG/DL (ref 70–99)
GLUCOSE BLDC GLUCOMTR-MCNC: 112 MG/DL (ref 70–99)
GLUCOSE BLDC GLUCOMTR-MCNC: 117 MG/DL (ref 70–99)
GLUCOSE BLDC GLUCOMTR-MCNC: 159 MG/DL (ref 70–99)
GLUCOSE SERPL-MCNC: 123 MG/DL (ref 70–99)
GLUCOSE UR STRIP-MCNC: NEGATIVE MG/DL
HCT VFR BLD AUTO: 36.5 % (ref 40–53)
HDLC SERPL-MCNC: 53 MG/DL
HGB BLD-MCNC: 12.5 G/DL (ref 13.3–17.7)
HGB UR QL STRIP: ABNORMAL
KCT BLD-ACNC: 160 SEC (ref 75–150)
KCT BLD-ACNC: 262 SEC (ref 75–150)
KCT BLD-ACNC: 298 SEC (ref 75–150)
KCT BLD-ACNC: 363 SEC (ref 75–150)
KETONES UR STRIP-MCNC: 10 MG/DL
LACTATE BLD-SCNC: 1.2 MMOL/L (ref 0.7–2)
LDLC SERPL CALC-MCNC: 59 MG/DL
LEUKOCYTE ESTERASE UR QL STRIP: NEGATIVE
LMWH PPP CHRO-ACNC: 0.37 IU/ML
MCH RBC QN AUTO: 29.6 PG (ref 26.5–33)
MCHC RBC AUTO-ENTMCNC: 34.2 G/DL (ref 31.5–36.5)
MCV RBC AUTO: 87 FL (ref 78–100)
MUCOUS THREADS #/AREA URNS LPF: PRESENT /LPF
NITRATE UR QL: NEGATIVE
NONHDLC SERPL-MCNC: 78 MG/DL
PH UR STRIP: 7 PH (ref 5–7)
PLATELET # BLD AUTO: 183 10E9/L (ref 150–450)
POTASSIUM SERPL-SCNC: 4 MMOL/L (ref 3.4–5.3)
PROT SERPL-MCNC: 7 G/DL (ref 6.8–8.8)
RBC # BLD AUTO: 4.22 10E12/L (ref 4.4–5.9)
RBC #/AREA URNS AUTO: 1 /HPF (ref 0–2)
RSV RNA SPEC NAA+PROBE: NEGATIVE
SODIUM SERPL-SCNC: 139 MMOL/L (ref 133–144)
SOURCE: ABNORMAL
SP GR UR STRIP: 1.01 (ref 1–1.03)
SPECIMEN SOURCE: NORMAL
TRIGL SERPL-MCNC: 93 MG/DL
UROBILINOGEN UR STRIP-MCNC: 2 MG/DL (ref 0–2)
WBC # BLD AUTO: 14.5 10E9/L (ref 4–11)
WBC #/AREA URNS AUTO: <1 /HPF (ref 0–5)

## 2018-10-18 PROCEDURE — 87631 RESP VIRUS 3-5 TARGETS: CPT | Performed by: INTERNAL MEDICINE

## 2018-10-18 PROCEDURE — 27210787 ZZH MANIFOLD CR2

## 2018-10-18 PROCEDURE — 25000132 ZZH RX MED GY IP 250 OP 250 PS 637: Performed by: INTERNAL MEDICINE

## 2018-10-18 PROCEDURE — 027035Z DILATION OF CORONARY ARTERY, ONE ARTERY WITH TWO DRUG-ELUTING INTRALUMINAL DEVICES, PERCUTANEOUS APPROACH: ICD-10-PCS | Performed by: INTERNAL MEDICINE

## 2018-10-18 PROCEDURE — 36415 COLL VENOUS BLD VENIPUNCTURE: CPT | Performed by: NURSE PRACTITIONER

## 2018-10-18 PROCEDURE — 25000128 H RX IP 250 OP 636: Performed by: STUDENT IN AN ORGANIZED HEALTH CARE EDUCATION/TRAINING PROGRAM

## 2018-10-18 PROCEDURE — 25000132 ZZH RX MED GY IP 250 OP 250 PS 637: Performed by: NURSE PRACTITIONER

## 2018-10-18 PROCEDURE — 93005 ELECTROCARDIOGRAM TRACING: CPT

## 2018-10-18 PROCEDURE — 25000128 H RX IP 250 OP 636: Performed by: NURSE PRACTITIONER

## 2018-10-18 PROCEDURE — 85347 COAGULATION TIME ACTIVATED: CPT

## 2018-10-18 PROCEDURE — 27210946 ZZH KIT HC TOTES DISP CR8

## 2018-10-18 PROCEDURE — C1874 STENT, COATED/COV W/DEL SYS: HCPCS

## 2018-10-18 PROCEDURE — 99233 SBSQ HOSP IP/OBS HIGH 50: CPT | Performed by: INTERNAL MEDICINE

## 2018-10-18 PROCEDURE — C9601 PERC DRUG-EL COR STENT BRAN: HCPCS

## 2018-10-18 PROCEDURE — 40000235 ZZH STATISTIC TELEMETRY

## 2018-10-18 PROCEDURE — C1760 CLOSURE DEV, VASC: HCPCS

## 2018-10-18 PROCEDURE — 92921 ZZHC PRQ TRLUML CORONARY ANGIOPLASTY ADDL BRANCH: CPT | Mod: LC | Performed by: INTERNAL MEDICINE

## 2018-10-18 PROCEDURE — C9600 PERC DRUG-EL COR STENT SING: HCPCS

## 2018-10-18 PROCEDURE — 25000125 ZZHC RX 250: Performed by: STUDENT IN AN ORGANIZED HEALTH CARE EDUCATION/TRAINING PROGRAM

## 2018-10-18 PROCEDURE — 85520 HEPARIN ASSAY: CPT | Performed by: NURSE PRACTITIONER

## 2018-10-18 PROCEDURE — 87040 BLOOD CULTURE FOR BACTERIA: CPT | Performed by: INTERNAL MEDICINE

## 2018-10-18 PROCEDURE — 92928 PRQ TCAT PLMT NTRAC ST 1 LES: CPT | Mod: LC | Performed by: INTERNAL MEDICINE

## 2018-10-18 PROCEDURE — 93010 ELECTROCARDIOGRAM REPORT: CPT | Performed by: INTERNAL MEDICINE

## 2018-10-18 PROCEDURE — 80076 HEPATIC FUNCTION PANEL: CPT | Performed by: INTERNAL MEDICINE

## 2018-10-18 PROCEDURE — 36415 COLL VENOUS BLD VENIPUNCTURE: CPT | Performed by: INTERNAL MEDICINE

## 2018-10-18 PROCEDURE — 00000146 ZZHCL STATISTIC GLUCOSE BY METER IP

## 2018-10-18 PROCEDURE — 80061 LIPID PANEL: CPT | Performed by: NURSE PRACTITIONER

## 2018-10-18 PROCEDURE — 25000132 ZZH RX MED GY IP 250 OP 250 PS 637: Performed by: STUDENT IN AN ORGANIZED HEALTH CARE EDUCATION/TRAINING PROGRAM

## 2018-10-18 PROCEDURE — C1725 CATH, TRANSLUMIN NON-LASER: HCPCS

## 2018-10-18 PROCEDURE — 99152 MOD SED SAME PHYS/QHP 5/>YRS: CPT | Mod: GC | Performed by: INTERNAL MEDICINE

## 2018-10-18 PROCEDURE — 99153 MOD SED SAME PHYS/QHP EA: CPT

## 2018-10-18 PROCEDURE — 25000128 H RX IP 250 OP 636: Performed by: INTERNAL MEDICINE

## 2018-10-18 PROCEDURE — 93458 L HRT ARTERY/VENTRICLE ANGIO: CPT | Mod: 26 | Performed by: INTERNAL MEDICINE

## 2018-10-18 PROCEDURE — 83605 ASSAY OF LACTIC ACID: CPT | Performed by: INTERNAL MEDICINE

## 2018-10-18 PROCEDURE — 93458 L HRT ARTERY/VENTRICLE ANGIO: CPT

## 2018-10-18 PROCEDURE — 27210845 ZZH DEVICE INFLATION CR5

## 2018-10-18 PROCEDURE — 27210795 ZZH PAD DEFIB QUICK CR4

## 2018-10-18 PROCEDURE — 81001 URINALYSIS AUTO W/SCOPE: CPT | Performed by: INTERNAL MEDICINE

## 2018-10-18 PROCEDURE — 92921 ZZHC PRQ TRLUML CORONARY ANGIOPLASTY ADDL BRANCH: CPT | Mod: LC

## 2018-10-18 PROCEDURE — 27210759 ZZH DEVICE INFLATION CR6

## 2018-10-18 PROCEDURE — 21000000 ZZH R&B IMCU HEART CARE

## 2018-10-18 PROCEDURE — C1887 CATHETER, GUIDING: HCPCS

## 2018-10-18 PROCEDURE — C1769 GUIDE WIRE: HCPCS

## 2018-10-18 PROCEDURE — 27211089 ZZH KIT ACIST INJECTOR CR3

## 2018-10-18 PROCEDURE — 80048 BASIC METABOLIC PNL TOTAL CA: CPT | Performed by: NURSE PRACTITIONER

## 2018-10-18 PROCEDURE — 99152 MOD SED SAME PHYS/QHP 5/>YRS: CPT

## 2018-10-18 PROCEDURE — 85027 COMPLETE CBC AUTOMATED: CPT | Performed by: NURSE PRACTITIONER

## 2018-10-18 PROCEDURE — 40000852 ZZH STATISTIC HEART CATH LAB OR EP LAB

## 2018-10-18 RX ORDER — PROTAMINE SULFATE 10 MG/ML
25-100 INJECTION, SOLUTION INTRAVENOUS EVERY 5 MIN PRN
Status: DISCONTINUED | OUTPATIENT
Start: 2018-10-18 | End: 2018-10-18 | Stop reason: HOSPADM

## 2018-10-18 RX ORDER — NALOXONE HYDROCHLORIDE 0.4 MG/ML
.2-.4 INJECTION, SOLUTION INTRAMUSCULAR; INTRAVENOUS; SUBCUTANEOUS
Status: ACTIVE | OUTPATIENT
Start: 2018-10-18 | End: 2018-10-19

## 2018-10-18 RX ORDER — NITROGLYCERIN 5 MG/ML
100-200 VIAL (ML) INTRAVENOUS
Status: DISCONTINUED | OUTPATIENT
Start: 2018-10-18 | End: 2018-10-18 | Stop reason: HOSPADM

## 2018-10-18 RX ORDER — POTASSIUM CHLORIDE 29.8 MG/ML
20 INJECTION INTRAVENOUS
Status: DISCONTINUED | OUTPATIENT
Start: 2018-10-18 | End: 2018-10-18 | Stop reason: HOSPADM

## 2018-10-18 RX ORDER — DOPAMINE HYDROCHLORIDE 160 MG/100ML
2-20 INJECTION, SOLUTION INTRAVENOUS CONTINUOUS PRN
Status: DISCONTINUED | OUTPATIENT
Start: 2018-10-18 | End: 2018-10-18 | Stop reason: HOSPADM

## 2018-10-18 RX ORDER — FLUMAZENIL 0.1 MG/ML
0.2 INJECTION, SOLUTION INTRAVENOUS
Status: ACTIVE | OUTPATIENT
Start: 2018-10-18 | End: 2018-10-19

## 2018-10-18 RX ORDER — ASPIRIN 81 MG/1
81 TABLET ORAL DAILY
Status: DISCONTINUED | OUTPATIENT
Start: 2018-10-19 | End: 2018-10-20 | Stop reason: HOSPADM

## 2018-10-18 RX ORDER — NIFEDIPINE 10 MG/1
10 CAPSULE ORAL
Status: DISCONTINUED | OUTPATIENT
Start: 2018-10-18 | End: 2018-10-18 | Stop reason: HOSPADM

## 2018-10-18 RX ORDER — METHYLPREDNISOLONE SODIUM SUCCINATE 125 MG/2ML
125 INJECTION, POWDER, LYOPHILIZED, FOR SOLUTION INTRAMUSCULAR; INTRAVENOUS
Status: DISCONTINUED | OUTPATIENT
Start: 2018-10-18 | End: 2018-10-18 | Stop reason: HOSPADM

## 2018-10-18 RX ORDER — LIDOCAINE HYDROCHLORIDE 10 MG/ML
1-10 INJECTION, SOLUTION EPIDURAL; INFILTRATION; INTRACAUDAL; PERINEURAL
Status: COMPLETED | OUTPATIENT
Start: 2018-10-18 | End: 2018-10-18

## 2018-10-18 RX ORDER — PHENYLEPHRINE HCL IN 0.9% NACL 1 MG/10 ML
20-100 SYRINGE (ML) INTRAVENOUS
Status: DISCONTINUED | OUTPATIENT
Start: 2018-10-18 | End: 2018-10-18 | Stop reason: HOSPADM

## 2018-10-18 RX ORDER — HYDROCODONE BITARTRATE AND ACETAMINOPHEN 5; 325 MG/1; MG/1
1-2 TABLET ORAL EVERY 4 HOURS PRN
Status: DISCONTINUED | OUTPATIENT
Start: 2018-10-18 | End: 2018-10-20 | Stop reason: HOSPADM

## 2018-10-18 RX ORDER — NALOXONE HYDROCHLORIDE 0.4 MG/ML
.1-.4 INJECTION, SOLUTION INTRAMUSCULAR; INTRAVENOUS; SUBCUTANEOUS
Status: DISCONTINUED | OUTPATIENT
Start: 2018-10-18 | End: 2018-10-20

## 2018-10-18 RX ORDER — FENTANYL CITRATE 50 UG/ML
25-50 INJECTION, SOLUTION INTRAMUSCULAR; INTRAVENOUS
Status: DISCONTINUED | OUTPATIENT
Start: 2018-10-18 | End: 2018-10-18 | Stop reason: HOSPADM

## 2018-10-18 RX ORDER — NITROGLYCERIN 0.4 MG/1
0.4 TABLET SUBLINGUAL EVERY 5 MIN PRN
Status: DISCONTINUED | OUTPATIENT
Start: 2018-10-18 | End: 2018-10-18 | Stop reason: HOSPADM

## 2018-10-18 RX ORDER — ADENOSINE 3 MG/ML
12-12000 INJECTION, SOLUTION INTRAVENOUS
Status: DISCONTINUED | OUTPATIENT
Start: 2018-10-18 | End: 2018-10-18 | Stop reason: HOSPADM

## 2018-10-18 RX ORDER — FENTANYL CITRATE 50 UG/ML
25-50 INJECTION, SOLUTION INTRAMUSCULAR; INTRAVENOUS
Status: ACTIVE | OUTPATIENT
Start: 2018-10-18 | End: 2018-10-19

## 2018-10-18 RX ORDER — NITROGLYCERIN 5 MG/ML
100-500 VIAL (ML) INTRAVENOUS
Status: DISCONTINUED | OUTPATIENT
Start: 2018-10-18 | End: 2018-10-18 | Stop reason: HOSPADM

## 2018-10-18 RX ORDER — LORAZEPAM 2 MG/ML
.5-2 INJECTION INTRAMUSCULAR EVERY 4 HOURS PRN
Status: DISCONTINUED | OUTPATIENT
Start: 2018-10-18 | End: 2018-10-18 | Stop reason: HOSPADM

## 2018-10-18 RX ORDER — POTASSIUM CHLORIDE 7.45 MG/ML
10 INJECTION INTRAVENOUS
Status: DISCONTINUED | OUTPATIENT
Start: 2018-10-18 | End: 2018-10-18 | Stop reason: HOSPADM

## 2018-10-18 RX ORDER — ENALAPRILAT 1.25 MG/ML
1.25-2.5 INJECTION INTRAVENOUS
Status: DISCONTINUED | OUTPATIENT
Start: 2018-10-18 | End: 2018-10-18 | Stop reason: HOSPADM

## 2018-10-18 RX ORDER — NICARDIPINE HYDROCHLORIDE 2.5 MG/ML
100 INJECTION INTRAVENOUS
Status: DISCONTINUED | OUTPATIENT
Start: 2018-10-18 | End: 2018-10-18 | Stop reason: HOSPADM

## 2018-10-18 RX ORDER — NITROGLYCERIN 0.4 MG/1
0.4 TABLET SUBLINGUAL EVERY 5 MIN PRN
Status: DISCONTINUED | OUTPATIENT
Start: 2018-10-18 | End: 2018-10-20 | Stop reason: HOSPADM

## 2018-10-18 RX ORDER — FUROSEMIDE 10 MG/ML
20-100 INJECTION INTRAMUSCULAR; INTRAVENOUS
Status: DISCONTINUED | OUTPATIENT
Start: 2018-10-18 | End: 2018-10-18 | Stop reason: HOSPADM

## 2018-10-18 RX ORDER — NALOXONE HYDROCHLORIDE 0.4 MG/ML
0.4 INJECTION, SOLUTION INTRAMUSCULAR; INTRAVENOUS; SUBCUTANEOUS EVERY 5 MIN PRN
Status: DISCONTINUED | OUTPATIENT
Start: 2018-10-18 | End: 2018-10-18 | Stop reason: HOSPADM

## 2018-10-18 RX ORDER — HEPARIN SODIUM 1000 [USP'U]/ML
1000-10000 INJECTION, SOLUTION INTRAVENOUS; SUBCUTANEOUS EVERY 5 MIN PRN
Status: DISCONTINUED | OUTPATIENT
Start: 2018-10-18 | End: 2018-10-18 | Stop reason: HOSPADM

## 2018-10-18 RX ORDER — DOBUTAMINE HYDROCHLORIDE 200 MG/100ML
2-20 INJECTION INTRAVENOUS CONTINUOUS PRN
Status: DISCONTINUED | OUTPATIENT
Start: 2018-10-18 | End: 2018-10-18 | Stop reason: HOSPADM

## 2018-10-18 RX ORDER — HYDRALAZINE HYDROCHLORIDE 20 MG/ML
10-20 INJECTION INTRAMUSCULAR; INTRAVENOUS
Status: DISCONTINUED | OUTPATIENT
Start: 2018-10-18 | End: 2018-10-18 | Stop reason: HOSPADM

## 2018-10-18 RX ORDER — ATROPINE SULFATE 0.1 MG/ML
.5-1 INJECTION INTRAVENOUS
Status: DISCONTINUED | OUTPATIENT
Start: 2018-10-18 | End: 2018-10-18 | Stop reason: HOSPADM

## 2018-10-18 RX ORDER — LIDOCAINE HYDROCHLORIDE 10 MG/ML
30 INJECTION, SOLUTION EPIDURAL; INFILTRATION; INTRACAUDAL; PERINEURAL
Status: DISCONTINUED | OUTPATIENT
Start: 2018-10-18 | End: 2018-10-18 | Stop reason: HOSPADM

## 2018-10-18 RX ORDER — PROTAMINE SULFATE 10 MG/ML
1-5 INJECTION, SOLUTION INTRAVENOUS
Status: DISCONTINUED | OUTPATIENT
Start: 2018-10-18 | End: 2018-10-18 | Stop reason: HOSPADM

## 2018-10-18 RX ORDER — DIPHENHYDRAMINE HYDROCHLORIDE 50 MG/ML
25-50 INJECTION INTRAMUSCULAR; INTRAVENOUS
Status: DISCONTINUED | OUTPATIENT
Start: 2018-10-18 | End: 2018-10-18 | Stop reason: HOSPADM

## 2018-10-18 RX ORDER — EPINEPHRINE 1 MG/ML
0.3 INJECTION, SOLUTION, CONCENTRATE INTRAVENOUS
Status: DISCONTINUED | OUTPATIENT
Start: 2018-10-18 | End: 2018-10-18 | Stop reason: HOSPADM

## 2018-10-18 RX ORDER — CLOPIDOGREL 300 MG/1
300-600 TABLET, FILM COATED ORAL
Status: DISCONTINUED | OUTPATIENT
Start: 2018-10-18 | End: 2018-10-18 | Stop reason: HOSPADM

## 2018-10-18 RX ORDER — PRASUGREL 10 MG/1
10-60 TABLET, FILM COATED ORAL
Status: DISCONTINUED | OUTPATIENT
Start: 2018-10-18 | End: 2018-10-18 | Stop reason: HOSPADM

## 2018-10-18 RX ORDER — ASPIRIN 325 MG
325 TABLET ORAL
Status: DISCONTINUED | OUTPATIENT
Start: 2018-10-18 | End: 2018-10-18 | Stop reason: HOSPADM

## 2018-10-18 RX ORDER — MORPHINE SULFATE 2 MG/ML
1-2 INJECTION, SOLUTION INTRAMUSCULAR; INTRAVENOUS EVERY 5 MIN PRN
Status: DISCONTINUED | OUTPATIENT
Start: 2018-10-18 | End: 2018-10-18 | Stop reason: HOSPADM

## 2018-10-18 RX ORDER — SODIUM CHLORIDE 9 MG/ML
INJECTION, SOLUTION INTRAVENOUS CONTINUOUS
Status: ACTIVE | OUTPATIENT
Start: 2018-10-18 | End: 2018-10-18

## 2018-10-18 RX ORDER — IOPAMIDOL 755 MG/ML
275 INJECTION, SOLUTION INTRAVASCULAR ONCE
Status: COMPLETED | OUTPATIENT
Start: 2018-10-18 | End: 2018-10-18

## 2018-10-18 RX ORDER — ASPIRIN 81 MG/1
81-324 TABLET, CHEWABLE ORAL
Status: DISCONTINUED | OUTPATIENT
Start: 2018-10-18 | End: 2018-10-18 | Stop reason: HOSPADM

## 2018-10-18 RX ORDER — DEXTROSE MONOHYDRATE 25 G/50ML
12.5-5 INJECTION, SOLUTION INTRAVENOUS EVERY 30 MIN PRN
Status: DISCONTINUED | OUTPATIENT
Start: 2018-10-18 | End: 2018-10-18 | Stop reason: HOSPADM

## 2018-10-18 RX ORDER — BUPIVACAINE HYDROCHLORIDE 2.5 MG/ML
1-10 INJECTION, SOLUTION EPIDURAL; INFILTRATION; INTRACAUDAL
Status: DISCONTINUED | OUTPATIENT
Start: 2018-10-18 | End: 2018-10-18 | Stop reason: HOSPADM

## 2018-10-18 RX ORDER — ONDANSETRON 2 MG/ML
4 INJECTION INTRAMUSCULAR; INTRAVENOUS EVERY 4 HOURS PRN
Status: DISCONTINUED | OUTPATIENT
Start: 2018-10-18 | End: 2018-10-18 | Stop reason: HOSPADM

## 2018-10-18 RX ORDER — SODIUM NITROPRUSSIDE 25 MG/ML
100-200 INJECTION INTRAVENOUS
Status: DISCONTINUED | OUTPATIENT
Start: 2018-10-18 | End: 2018-10-18 | Stop reason: HOSPADM

## 2018-10-18 RX ORDER — ATROPINE SULFATE 0.1 MG/ML
0.5 INJECTION INTRAVENOUS EVERY 5 MIN PRN
Status: ACTIVE | OUTPATIENT
Start: 2018-10-18 | End: 2018-10-19

## 2018-10-18 RX ORDER — VERAPAMIL HYDROCHLORIDE 2.5 MG/ML
1-2.5 INJECTION, SOLUTION INTRAVENOUS
Status: DISCONTINUED | OUTPATIENT
Start: 2018-10-18 | End: 2018-10-18 | Stop reason: HOSPADM

## 2018-10-18 RX ORDER — FUROSEMIDE 40 MG
40 TABLET ORAL DAILY
Status: DISCONTINUED | OUTPATIENT
Start: 2018-10-18 | End: 2018-10-19

## 2018-10-18 RX ORDER — METOPROLOL TARTRATE 1 MG/ML
5 INJECTION, SOLUTION INTRAVENOUS EVERY 5 MIN PRN
Status: DISCONTINUED | OUTPATIENT
Start: 2018-10-18 | End: 2018-10-18 | Stop reason: HOSPADM

## 2018-10-18 RX ORDER — CLOPIDOGREL BISULFATE 75 MG/1
75 TABLET ORAL
Status: DISCONTINUED | OUTPATIENT
Start: 2018-10-18 | End: 2018-10-18 | Stop reason: HOSPADM

## 2018-10-18 RX ORDER — ACETAMINOPHEN 325 MG/1
325-650 TABLET ORAL EVERY 4 HOURS PRN
Status: DISCONTINUED | OUTPATIENT
Start: 2018-10-18 | End: 2018-10-20 | Stop reason: HOSPADM

## 2018-10-18 RX ORDER — FLUMAZENIL 0.1 MG/ML
0.2 INJECTION, SOLUTION INTRAVENOUS
Status: DISCONTINUED | OUTPATIENT
Start: 2018-10-18 | End: 2018-10-18 | Stop reason: HOSPADM

## 2018-10-18 RX ORDER — CARVEDILOL 6.25 MG/1
6.25 TABLET ORAL 2 TIMES DAILY WITH MEALS
Status: DISCONTINUED | OUTPATIENT
Start: 2018-10-18 | End: 2018-10-20

## 2018-10-18 RX ORDER — NITROGLYCERIN 20 MG/100ML
.07-2 INJECTION INTRAVENOUS CONTINUOUS PRN
Status: DISCONTINUED | OUTPATIENT
Start: 2018-10-18 | End: 2018-10-18 | Stop reason: HOSPADM

## 2018-10-18 RX ADMIN — HEPARIN SODIUM 7000 UNITS: 1000 INJECTION, SOLUTION INTRAVENOUS; SUBCUTANEOUS at 11:32

## 2018-10-18 RX ADMIN — IOPAMIDOL 275 ML: 755 INJECTION, SOLUTION INTRAVASCULAR at 13:15

## 2018-10-18 RX ADMIN — ACETAMINOPHEN 650 MG: 325 TABLET, FILM COATED ORAL at 16:05

## 2018-10-18 RX ADMIN — MIDAZOLAM 1 MG: 1 INJECTION INTRAMUSCULAR; INTRAVENOUS at 11:57

## 2018-10-18 RX ADMIN — TICAGRELOR 180 MG: 90 TABLET ORAL at 12:31

## 2018-10-18 RX ADMIN — MIDAZOLAM 1 MG: 1 INJECTION INTRAMUSCULAR; INTRAVENOUS at 11:10

## 2018-10-18 RX ADMIN — FENTANYL CITRATE 50 MCG: 50 INJECTION, SOLUTION INTRAMUSCULAR; INTRAVENOUS at 12:48

## 2018-10-18 RX ADMIN — MIDAZOLAM 1 MG: 1 INJECTION INTRAMUSCULAR; INTRAVENOUS at 11:40

## 2018-10-18 RX ADMIN — ACETAMINOPHEN 650 MG: 325 TABLET, FILM COATED ORAL at 20:13

## 2018-10-18 RX ADMIN — MIDAZOLAM 1 MG: 1 INJECTION INTRAMUSCULAR; INTRAVENOUS at 11:06

## 2018-10-18 RX ADMIN — ATORVASTATIN CALCIUM 40 MG: 40 TABLET, FILM COATED ORAL at 20:14

## 2018-10-18 RX ADMIN — ASPIRIN 325 MG: 325 TABLET, DELAYED RELEASE ORAL at 09:38

## 2018-10-18 RX ADMIN — SODIUM NITROPRUSSIDE 150 MCG: 25 INJECTION INTRAVENOUS at 11:40

## 2018-10-18 RX ADMIN — FENTANYL CITRATE 50 MCG: 50 INJECTION, SOLUTION INTRAMUSCULAR; INTRAVENOUS at 11:10

## 2018-10-18 RX ADMIN — MIDAZOLAM 1 MG: 1 INJECTION INTRAMUSCULAR; INTRAVENOUS at 12:48

## 2018-10-18 RX ADMIN — FUROSEMIDE 40 MG: 40 TABLET ORAL at 16:05

## 2018-10-18 RX ADMIN — HEPARIN SODIUM 700 UNITS/HR: 10000 INJECTION, SOLUTION INTRAVENOUS at 07:59

## 2018-10-18 RX ADMIN — SODIUM NITROPRUSSIDE 100 MCG: 25 INJECTION INTRAVENOUS at 12:57

## 2018-10-18 RX ADMIN — FENTANYL CITRATE 50 MCG: 50 INJECTION, SOLUTION INTRAMUSCULAR; INTRAVENOUS at 11:05

## 2018-10-18 RX ADMIN — SODIUM CHLORIDE: 9 INJECTION, SOLUTION INTRAVENOUS at 06:58

## 2018-10-18 RX ADMIN — ACETAMINOPHEN 650 MG: 325 TABLET, FILM COATED ORAL at 08:00

## 2018-10-18 RX ADMIN — LIDOCAINE HYDROCHLORIDE 10 ML: 10 INJECTION, SOLUTION EPIDURAL; INFILTRATION; INTRACAUDAL; PERINEURAL at 11:10

## 2018-10-18 RX ADMIN — HEPARIN SODIUM 800 UNITS/HR: 10000 INJECTION, SOLUTION INTRAVENOUS at 07:32

## 2018-10-18 RX ADMIN — MIDAZOLAM 1 MG: 1 INJECTION INTRAMUSCULAR; INTRAVENOUS at 12:33

## 2018-10-18 ASSESSMENT — ACTIVITIES OF DAILY LIVING (ADL)
ADLS_ACUITY_SCORE: 11

## 2018-10-18 ASSESSMENT — PAIN DESCRIPTION - DESCRIPTORS
DESCRIPTORS: ACHING
DESCRIPTORS: HEADACHE

## 2018-10-18 NOTE — PROGRESS NOTES
[facility]  Hospitalist Progress Note    Date of Service (when I saw the patient): 10/18/2018  Neptali Duong is a 43 year old male who was admitted on 10/17/2018.     Assessment & Plan       1-Acute NSTEMI 2-3 days PTA.  Ischemic symptoms resolved PTA with ongoing congestive heart failure (dyspnea) since.  Echo shows extensive inferolateral hypokinesis and some inferior hypokinesis.  Ejection fraction 50%, down trending trops. For angiogram today.     2-Acute systolic congestive heart failure, secondary to acute MI.  Improving, but still sx on diuretic. Soft BP's.    3.  Mild leukocytosis with history of myalgias and chills.  Remains afebrile.  Check influenza swab and blood cultures.  Okay for angiogram today.  Mild stress headache.    4.-Dyslipidemia, on statin therapy      5-Chronic renal insufficiency, creatinine of 1.2.           DVT Prophylaxis: Heparin  Code Status: Full Code    Disposition: Expected discharge in 1-2 days once CAD evaluation is complete and heart failure symptoms improved.    Earl Braswell MD       Interval History   Patient denies any recurrent chest discomfort.  He does complain of dyspnea with activity.  Denies any dizziness when walking in the room, but has some lightheadedness when walking around the nursing station.  Patient does note minimal discomfort with deep breath mid chest.  Denies any extremity pain or swelling, denies any dysuria or urinary frequency.  Patient does have a mild frontal cervical headache which is not unusual for him.    -Data reviewed today: I reviewed all new labs and imaging results over the last 24 hours.    Physical Exam   Temp: 99.9  F (37.7  C) Temp src: Oral BP: 97/53 Pulse: 78 Heart Rate: 93 Resp: 18 SpO2: 97 % O2 Device: None (Room air)    Vitals:    10/17/18 1000 10/18/18 0540   Weight: 69.9 kg (154 lb 2.1 oz) 69.5 kg (153 lb 3.2 oz)     Vital Signs with Ranges  Temp:  [97.9  F (36.6  C)-99.9  F (37.7  C)] 99.9  F (37.7  C)  Pulse:  [78]  78  Heart Rate:  [78-93] 93  Resp:  [16-20] 18  BP: ()/(44-81) 97/53  SpO2:  [94 %-100 %] 97 %  I/O last 3 completed shifts:  In: 240 [P.O.:240]  Out: 1000 [Urine:1000]    Constitutional: alert and oriented, no acute respiratory distress  Respiratory: Lungs clear to auscultation, no wheeze or crackles  Cardiovascular: Regular rate and rhythm, no murmur or rub  GI: Non distended, normal BS, no organomegaly,masses or tenderness  Skin/Integumen: No edema, normal pulse and color  Neuro: No focal deficits    Medications     - MEDICATION INSTRUCTIONS -       HEParin 700 Units/hr (10/18/18 0759)     - MEDICATION INSTRUCTIONS -       - MEDICATION INSTRUCTIONS -       - MEDICATION INSTRUCTIONS -       Reason ACE/ARB/ARNI order not selected       Reason beta blocker order not selected       sodium chloride 150 mL/hr at 10/18/18 0658       aspirin  325 mg Oral Daily     atorvastatin  40 mg Oral QPM     furosemide  20 mg Intravenous Q12H     insulin aspart  1-4 Units Subcutaneous Q4H     sodium chloride (PF)  3 mL Intracatheter Q8H       Data     Recent Labs  Lab 10/18/18  0526 10/17/18  0526   WBC 14.5* 11.9*   HGB 12.5* 14.7    236    138   POTASSIUM 4.0 3.9   BUN 17 14   CR 1.15 1.22   THOMAS 8.5 8.6   * 126*       Imaging: No results found for this or any previous visit (from the past 24 hour(s)).

## 2018-10-18 NOTE — PLAN OF CARE
Problem: Patient Care Overview  Goal: Plan of Care/Patient Progress Review  CR/PT: Pt scheduled to have angio today, will hold evaluation until after procedure.

## 2018-10-18 NOTE — PROGRESS NOTES
Patient transported to the care suites for observation and hold for the Cath Lab.  He is alert and oriented, skin is warm and dry.  Good distal pulses and in NSR

## 2018-10-18 NOTE — PLAN OF CARE
Problem: Patient Care Overview  Goal: Plan of Care/Patient Progress Review  Outcome: No Change  Patient received 2 stent today to heart arteries, no chest pain, Right groin is dry intact no bleeding no hematoma no bruit. Bedrest till 0330. Continue to monitor.

## 2018-10-18 NOTE — PROGRESS NOTES
[facility]  Cardiology Progress Note    Date of Service: 10/18/2018       Patient being seen by cardiology in follow up of acute myocardial infarction.     Interval history:  Awaiting cardiac cath today. Remains on heparin.   BP a bit soft but asymptomatic.   (-)760cc with lasix on arrival.     Discussed with available staff at bedside, chart reviewed, patient seen and examined.      ASSESSMENT/PLAN:    1. Acute MI.   --Had recent chest pain which resolved >24 hrs prior to arrival. Troponin significantly elevated at 96 but trended downward. Remains on heparin gtt, cath lab planned today. Noted marked family history of premature coronary disease with brother  of MI in his mid 30s, and father having his first ischemic symptoms before age 50.   --Echo showed extensive inferolateral hypokinesis with some inferior hypokinesis. EF mildly decreased at 50%. Currently on IV lasix but mild hypotension with mild renal insufficiency. May be able to discontinue; will reassess post cath after further data available. Plan to add beta blocker as BP allows.    --On heparin gtt, continue ASA.    --LDL this admit 59, continue atorvastatin 40mg daily (was on 10mg on admit).       HPI:  Neptali Duong is a 43 year old male with past medical history significant for hyperlipidemia who was admitted on 10/17/2018 with recent chest pain  but resolved 24 hours prior to admission. He then developed orthopnea and dyspnea on exertion. EKG showed prominent R waves in V1 through V3 with ST depression and would be consistent with a recent or acute posterior MI.  Initial troponin elevated at 96 but have trended down. Echo showed large inferolateral and some inferior regional wall motion abnormality with mildly decreased ejection fraction 50%. No valve disease identified. Cath lab planned.       Patient Active Problem List   Diagnosis     ACS (acute coronary syndrome) (H)     Glucose intolerance     AMI inferoposterior wall (H)  "      Subjective:  Seen only briefly while being placed on gurney to go to cath lab.  Denies any acute chest pain currently.       Objective:  Vital signs:  BP 97/53 (BP Location: Right arm)  Pulse 78  Temp 99.9  F (37.7  C) (Oral)  Resp 18  Ht 1.651 m (5' 5\")  Wt 69.5 kg (153 lb 3.2 oz)  SpO2 97%  BMI 25.49 kg/m2    Intake/Output Summary (Last 24 hours) at 10/18/18 1011  Last data filed at 10/17/18 1526   Gross per 24 hour   Intake              240 ml   Output             1000 ml   Net             -760 ml       Vitals:    10/17/18 1000 10/18/18 0540   Weight: 69.9 kg (154 lb 2.1 oz) 69.5 kg (153 lb 3.2 oz)       PE:  Gen: In general, this is a well nourished male, resting on gurney, in no acute distress on room air.  Full exam not done during FAWN visit, pt en route to cath lab.     Current Medications:?    aspirin  325 mg Oral Daily     atorvastatin  40 mg Oral QPM     furosemide  20 mg Intravenous Q12H     insulin aspart  1-4 Units Subcutaneous Q4H     sodium chloride (PF)  3 mL Intracatheter Q8H         Labs/Imaging/Additional testing:  The following labs and imaging were reviewed during today's visit:      Recent Labs  Lab 10/17/18  1402 10/17/18  1058 10/17/18  0526   TROPI 72.056* 83.219* 96.916*         Recent Labs  Lab 10/18/18  0526 10/17/18  1402 10/17/18  1058 10/17/18  0526   WBC 14.5*  --   --  11.9*   HGB 12.5*  --   --  14.7   MCV 87  --   --  88     --   --  236     --   --  138   POTASSIUM 4.0  --   --  3.9   CHLORIDE 107  --   --  105   CO2 23  --   --  25   BUN 17  --   --  14   CR 1.15  --   --  1.22   GFRESTIMATED 69  --   --  64   GFRESTBLACK 83  --   --  78   ANIONGAP 9  --   --  8   THOMAS 8.5  --   --  8.6   *  --   --  126*   TROPI  --  72.056* 83.219* 96.916*     Recent Labs   Lab Test  10/18/18   0526   CHOL  131   HDL  53   LDL  59   TRIG  93       Tele:  Sinus rhythm.    Imaging:  Recent Results (from the past 48 hour(s))   Chest XR,  PA & LAT    Narrative    " CHEST 2 VIEWS  10/17/2018 6:03 AM     HISTORY: Chest pain.    COMPARISON: None.    FINDINGS: The lungs are clear. Normal-sized cardiac silhouette.      Impression    IMPRESSION: No evidence of active cardiopulmonary disease.    SHRUTI FRIED MD       Echo:  10/17/18  Interpretation Summary     1. The left ventricle is normal in size. The visual ejection fraction is  estimated at 50%. There is mild inferolateral wall hypokinesis.  2. The right ventricle is normal in structure, function and size.  3. No significant valve disease.     No previous echo for comparison.?    Casi Belcher PA-C  [facility]  Pager [phone]

## 2018-10-18 NOTE — PLAN OF CARE
Problem: Patient Care Overview  Goal: Plan of Care/Patient Progress Review  Outcome: No Change  A&OX4, BP soft, GARZON, on room air. Denies SOB/CP.Tele- SR. Complains of headache,PO tylenol administered this shift with good relief. NPO for angio, ,111. Up with SBA. Heparin infusing at 800 ml/hr. K replaced this shift. Crackles in the bases.

## 2018-10-19 ENCOUNTER — APPOINTMENT (OUTPATIENT)
Dept: PHYSICAL THERAPY | Facility: CLINIC | Age: 43
End: 2018-10-19
Attending: HOSPITALIST
Payer: COMMERCIAL

## 2018-10-19 ENCOUNTER — APPOINTMENT (OUTPATIENT)
Dept: PHYSICAL THERAPY | Facility: CLINIC | Age: 43
End: 2018-10-19
Attending: NURSE PRACTITIONER
Payer: COMMERCIAL

## 2018-10-19 LAB
ANION GAP SERPL CALCULATED.3IONS-SCNC: 10 MMOL/L (ref 3–14)
BASOPHILS # BLD AUTO: 0 10E9/L (ref 0–0.2)
BASOPHILS NFR BLD AUTO: 0.2 %
BUN SERPL-MCNC: 20 MG/DL (ref 7–30)
CALCIUM SERPL-MCNC: 8.3 MG/DL (ref 8.5–10.1)
CHLORIDE SERPL-SCNC: 106 MMOL/L (ref 94–109)
CO2 SERPL-SCNC: 24 MMOL/L (ref 20–32)
CREAT SERPL-MCNC: 1.28 MG/DL (ref 0.66–1.25)
DIFFERENTIAL METHOD BLD: ABNORMAL
EOSINOPHIL # BLD AUTO: 0 10E9/L (ref 0–0.7)
EOSINOPHIL NFR BLD AUTO: 0.1 %
ERYTHROCYTE [DISTWIDTH] IN BLOOD BY AUTOMATED COUNT: 13.3 % (ref 10–15)
GFR SERPL CREATININE-BSD FRML MDRD: 61 ML/MIN/1.7M2
GLUCOSE BLDC GLUCOMTR-MCNC: 110 MG/DL (ref 70–99)
GLUCOSE BLDC GLUCOMTR-MCNC: 118 MG/DL (ref 70–99)
GLUCOSE BLDC GLUCOMTR-MCNC: 135 MG/DL (ref 70–99)
GLUCOSE BLDC GLUCOMTR-MCNC: 150 MG/DL (ref 70–99)
GLUCOSE SERPL-MCNC: 107 MG/DL (ref 70–99)
HCT VFR BLD AUTO: 33.6 % (ref 40–53)
HGB BLD-MCNC: 11.5 G/DL (ref 13.3–17.7)
IMM GRANULOCYTES # BLD: 0 10E9/L (ref 0–0.4)
IMM GRANULOCYTES NFR BLD: 0.3 %
LACTATE BLD-SCNC: 0.9 MMOL/L (ref 0.7–2)
LYMPHOCYTES # BLD AUTO: 1.1 10E9/L (ref 0.8–5.3)
LYMPHOCYTES NFR BLD AUTO: 8.8 %
MAGNESIUM SERPL-MCNC: 2.4 MG/DL (ref 1.6–2.3)
MCH RBC QN AUTO: 29.6 PG (ref 26.5–33)
MCHC RBC AUTO-ENTMCNC: 34.2 G/DL (ref 31.5–36.5)
MCV RBC AUTO: 87 FL (ref 78–100)
MONOCYTES # BLD AUTO: 1 10E9/L (ref 0–1.3)
MONOCYTES NFR BLD AUTO: 8.1 %
NEUTROPHILS # BLD AUTO: 10 10E9/L (ref 1.6–8.3)
NEUTROPHILS NFR BLD AUTO: 82.5 %
NRBC # BLD AUTO: 0 10*3/UL
NRBC BLD AUTO-RTO: 0 /100
PHOSPHATE SERPL-MCNC: 1.9 MG/DL (ref 2.5–4.5)
PHOSPHATE SERPL-MCNC: 1.9 MG/DL (ref 2.5–4.5)
PLATELET # BLD AUTO: 169 10E9/L (ref 150–450)
POTASSIUM SERPL-SCNC: 3.7 MMOL/L (ref 3.4–5.3)
RBC # BLD AUTO: 3.88 10E12/L (ref 4.4–5.9)
SODIUM SERPL-SCNC: 140 MMOL/L (ref 133–144)
WBC # BLD AUTO: 12.1 10E9/L (ref 4–11)

## 2018-10-19 PROCEDURE — 25000132 ZZH RX MED GY IP 250 OP 250 PS 637: Performed by: INTERNAL MEDICINE

## 2018-10-19 PROCEDURE — 25000132 ZZH RX MED GY IP 250 OP 250 PS 637: Performed by: STUDENT IN AN ORGANIZED HEALTH CARE EDUCATION/TRAINING PROGRAM

## 2018-10-19 PROCEDURE — 4A023N7 MEASUREMENT OF CARDIAC SAMPLING AND PRESSURE, LEFT HEART, PERCUTANEOUS APPROACH: ICD-10-PCS | Performed by: INTERNAL MEDICINE

## 2018-10-19 PROCEDURE — 83735 ASSAY OF MAGNESIUM: CPT | Performed by: INTERNAL MEDICINE

## 2018-10-19 PROCEDURE — 36415 COLL VENOUS BLD VENIPUNCTURE: CPT | Performed by: INTERNAL MEDICINE

## 2018-10-19 PROCEDURE — 80048 BASIC METABOLIC PNL TOTAL CA: CPT | Performed by: INTERNAL MEDICINE

## 2018-10-19 PROCEDURE — 97161 PT EVAL LOW COMPLEX 20 MIN: CPT | Mod: GP

## 2018-10-19 PROCEDURE — 85025 COMPLETE CBC W/AUTO DIFF WBC: CPT | Performed by: INTERNAL MEDICINE

## 2018-10-19 PROCEDURE — 25000132 ZZH RX MED GY IP 250 OP 250 PS 637: Performed by: NURSE PRACTITIONER

## 2018-10-19 PROCEDURE — 25000128 H RX IP 250 OP 636: Performed by: NURSE PRACTITIONER

## 2018-10-19 PROCEDURE — B2111ZZ FLUOROSCOPY OF MULTIPLE CORONARY ARTERIES USING LOW OSMOLAR CONTRAST: ICD-10-PCS | Performed by: INTERNAL MEDICINE

## 2018-10-19 PROCEDURE — 40000193 ZZH STATISTIC PT WARD VISIT

## 2018-10-19 PROCEDURE — 99232 SBSQ HOSP IP/OBS MODERATE 35: CPT | Performed by: INTERNAL MEDICINE

## 2018-10-19 PROCEDURE — 25000125 ZZHC RX 250: Performed by: NURSE PRACTITIONER

## 2018-10-19 PROCEDURE — 97110 THERAPEUTIC EXERCISES: CPT | Mod: GP

## 2018-10-19 PROCEDURE — 83605 ASSAY OF LACTIC ACID: CPT | Performed by: INTERNAL MEDICINE

## 2018-10-19 PROCEDURE — 93005 ELECTROCARDIOGRAM TRACING: CPT

## 2018-10-19 PROCEDURE — 84100 ASSAY OF PHOSPHORUS: CPT | Performed by: INTERNAL MEDICINE

## 2018-10-19 PROCEDURE — 93010 ELECTROCARDIOGRAM REPORT: CPT | Performed by: INTERNAL MEDICINE

## 2018-10-19 PROCEDURE — 21000000 ZZH R&B IMCU HEART CARE

## 2018-10-19 PROCEDURE — 00000146 ZZHCL STATISTIC GLUCOSE BY METER IP

## 2018-10-19 RX ADMIN — ASPIRIN 81 MG: 81 TABLET, COATED ORAL at 08:21

## 2018-10-19 RX ADMIN — ACETAMINOPHEN 650 MG: 325 TABLET, FILM COATED ORAL at 00:52

## 2018-10-19 RX ADMIN — TICAGRELOR 90 MG: 90 TABLET ORAL at 00:52

## 2018-10-19 RX ADMIN — CARVEDILOL 6.25 MG: 6.25 TABLET, FILM COATED ORAL at 08:21

## 2018-10-19 RX ADMIN — TICAGRELOR 90 MG: 90 TABLET ORAL at 11:32

## 2018-10-19 RX ADMIN — POTASSIUM PHOSPHATE, MONOBASIC 500 MG: 500 TABLET, SOLUBLE ORAL at 18:52

## 2018-10-19 RX ADMIN — POTASSIUM PHOSPHATE, MONOBASIC AND POTASSIUM PHOSPHATE, DIBASIC 20 MMOL: 224; 236 INJECTION, SOLUTION INTRAVENOUS at 08:16

## 2018-10-19 RX ADMIN — ACETAMINOPHEN 650 MG: 325 TABLET, FILM COATED ORAL at 10:03

## 2018-10-19 RX ADMIN — ACETAMINOPHEN 650 MG: 325 TABLET, FILM COATED ORAL at 15:59

## 2018-10-19 RX ADMIN — ACETAMINOPHEN 650 MG: 325 TABLET, FILM COATED ORAL at 20:08

## 2018-10-19 RX ADMIN — ATORVASTATIN CALCIUM 40 MG: 40 TABLET, FILM COATED ORAL at 20:08

## 2018-10-19 RX ADMIN — POTASSIUM PHOSPHATE, MONOBASIC 500 MG: 500 TABLET, SOLUBLE ORAL at 21:58

## 2018-10-19 RX ADMIN — ACETAMINOPHEN 650 MG: 325 TABLET, FILM COATED ORAL at 05:46

## 2018-10-19 ASSESSMENT — PAIN DESCRIPTION - DESCRIPTORS
DESCRIPTORS: ACHING
DESCRIPTORS: DISCOMFORT
DESCRIPTORS: ACHING
DESCRIPTORS: DISCOMFORT

## 2018-10-19 NOTE — CONSULTS
Medication coverage check for Brilinta. $5 copay after coupon card. ($100 copay before coupon card.)  Juliette Cole CphT  [facility] Liaison  Liaison Cell: [phone]

## 2018-10-19 NOTE — PLAN OF CARE
Problem: Patient Care Overview  Goal: Plan of Care/Patient Progress Review  Outcome: No Change  Up in the room independent, tolerated food, replaced phosphorus, patient is having low fever getting Tylenol every 4 hours. Continue to monitor. Discharge tomorrow.

## 2018-10-19 NOTE — PROGRESS NOTES
[facility]  Cardiology Progress Note    Date of Service: 10/19/2018       Patient being seen by cardiology in follow up of acute myocardial infarction.     Primary cardiologist:  Dr. Sammy Sorenson (new with this hospital stay)    Interval history:  Angio yesterday, had PCI of mid cirumflex into OM3 with overlapping stents. Noted elevated LVEDP 22mHg.   Intermittent low grade fevers persist, though overall feeling better.  Soft BP but no dizziness. SCr up slightly, I/O (-) 560cc since admit.     Discussed with available staff at bedside, chart reviewed, patient seen and examined.      ASSESSMENT/PLAN:    1. NSTEMI.             --No known prior cardiac disease. Presented with dyspnea and history of recent chest pain which resolved >24 hrs prior to arrival. Troponin significantly elevated at 96 but trended downward. Noted marked family history of premature coronary disease with brother  of MI in his mid 30s, and father having his first ischemic symptoms before age 50.     --Angio showed thrombotic 90% stenosis of mid circumflex. Culprit lesion was a proximal circumflex with disease extending into the mid circumflex and a large obtuse.  Successful overlapping JOHN performed with excellent angiographic result.  No significant right coronary artery LAD disease.  LVEDP 22mHg.                         --Echo showed extensive inferolateral hypokinesis with some inferior hypokinesis. EF mildly decreased at 50% (possibly a bit lower on visual per Dr. Sorenson). Currently on lasix but mild hypotension with mild renal insufficiency. Will discontinue and reassess on follow up whether low dose diuretic needed. Continue carvedilol. No BP room for addition of ACE-I for now.                         --Continue DAPT with ASA and Brilinta for minimum of 1 year. No active CP, discharge with PRN NTG.                         --LDL this admit 59, continue atorvastatin 40mg daily (was on 10mg on admit). Will plan repeat FLP  "in 4-6 weeks.     --Continue cardiac rehab.      Ok for home from cardiac standpoint, though fever workup ongoing as per primary team.       Follow up:  With Uma Longo PA-C on 10/26/18 9am labs 10am visit.      HPI:  Neptali Duong is a 43 year old male with past medical history significant for hyperlipidemia who was admitted on 10/17/2018 with recent chest pain  but resolved 24 hours prior to admission. He then developed orthopnea and dyspnea on exertion. EKG showed prominent R waves in V1 through V3 with ST depression and would be consistent with a recent or acute posterior MI.  Initial troponin elevated at 96 but have trended down. Echo showed large inferolateral and some inferior regional wall motion abnormality with mildly decreased ejection fraction 50%. No valve disease identified. Taken to cath lab, found to have thrombotic stenosis mid circumflex and received overlapping stents x 2 with good result.      Patient Active Problem List   Diagnosis     ACS (acute coronary syndrome) (H)     Glucose intolerance     AMI inferoposterior wall (H)       Subjective:  \"I think overall I'm feeling better.\"  Was able to participate in cardiac rehab. Says no further dyspnea, and denies chest pain, palpitations, or dizziness. Also denies leg edema or orthopnea.      Objective:  Vital signs:  BP (!) 89/59  Pulse 78  Temp 98.4  F (36.9  C) (Oral)  Resp 16  Ht 1.651 m (5' 5\")  Wt 69 kg (152 lb 3.2 oz)  SpO2 100%  BMI 25.33 kg/m2    Intake/Output Summary (Last 24 hours) at 10/19/18 1211  Last data filed at 10/19/18 0800   Gross per 24 hour   Intake          2191.75 ml   Output             1550 ml   Net           641.75 ml       Vitals:    10/17/18 1000 10/18/18 0540 10/19/18 0545   Weight: 69.9 kg (154 lb 2.1 oz) 69.5 kg (153 lb 3.2 oz) 69 kg (152 lb 3.2 oz)       PE:  Gen: In general, this is a well nourished male, resting in bed,  in no acute distress on room air.  Neck: Supple with midline trachea. No significant " JVD identified with patient HOB elevated.  CV: Regular rate and rhythm without murmur or rub appreciated.   Resp:Respirations are unlabored without use of accessory muscles. Auscultation of the lungs reveals clear lung fields bilaterally without wheezes or rhonchi.   GI: Soft, nontender, nondistended. BS present.   Extrem: Extremities are warm, without cyanosis or clubbing. Trace nonpitting ankle edema.   Neuro: Patient is alert, oriented, and cooperative. No obvious focal abnormalities.  Psych: Affect is normal.   Skin: No visible rashes. Clean and dry.     Current Medications:?    aspirin  81 mg Oral Daily     atorvastatin  40 mg Oral QPM     carvedilol  6.25 mg Oral BID w/meals     insulin aspart  1-4 Units Subcutaneous Q4H     potassium phosphate (monobasic)  500 mg Oral 4x Daily w/meals     sodium chloride (PF)  3 mL Intracatheter Q8H     ticagrelor  90 mg Oral Q12H         Labs/Imaging/Additional testing:  The following labs and imaging were reviewed during today's visit:      Recent Labs  Lab 10/17/18  1402 10/17/18  1058 10/17/18  0526   TROPI 72.056* 83.219* 96.916*         Recent Labs  Lab 10/19/18  0535 10/18/18  0950 10/18/18  0526 10/17/18  1402 10/17/18  1058 10/17/18  0526   WBC 12.1*  --  14.5*  --   --  11.9*   HGB 11.5*  --  12.5*  --   --  14.7   MCV 87  --  87  --   --  88     --  183  --   --  236     --  139  --   --  138   POTASSIUM 3.7  --  4.0  --   --  3.9   CHLORIDE 106  --  107  --   --  105   CO2 24  --  23  --   --  25   BUN 20  --  17  --   --  14   CR 1.28*  --  1.15  --   --  1.22   GFRESTIMATED 61  --  69  --   --  64   GFRESTBLACK 73  --  83  --   --  78   ANIONGAP 10  --  9  --   --  8   THOMAS 8.3*  --  8.5  --   --  8.6   *  --  123*  --   --  126*   ALBUMIN  --  3.4  --   --   --   --    PROTTOTAL  --  7.0  --   --   --   --    BILITOTAL  --  1.3  --   --   --   --    ALKPHOS  --  66  --   --   --   --    ALT  --  72*  --   --   --   --    AST  --  276*  --    --   --   --    TROPI  --   --   --  72.056* 83.219* 96.916*     Recent Labs   Lab Test  10/18/18   0526   CHOL  131   HDL  53   LDL  59   TRIG  93       Tele:  Sinus rhythm    Echo:  10/17/18  Interpretation Summary     1. The left ventricle is normal in size. The visual ejection fraction is  estimated at 50%. There is mild inferolateral wall hypokinesis.  2. The right ventricle is normal in structure, function and size.  3. No significant valve disease.     ??  Casi Belcher PA-C  [facility] Heart  Pager [phone]

## 2018-10-19 NOTE — CONSULTS
BRIEF NUTRITION NOTE      REASON FOR NUTRITION CONSULT:  Provider Order  -  Nutrition education - Dietitian to see pt for Heart Healthy diet education       Unable to complete nutrition education at this time.       FOLLOW UP:   Will follow up in 1-2 days     Rosy Nguyen RD, LD

## 2018-10-19 NOTE — PROGRESS NOTES
10/19/18 0911   Quick Adds   Type of Visit Initial PT Evaluation   Living Environment   Lives With spouse   Living Arrangements house   Home Accessibility stairs to enter home;stairs within home   Number of Stairs to Enter Home 2   Number of Stairs Within Home 14   Stair Railings at Home inside, present on right side   Transportation Available car;family or friend will provide   Self-Care   Dominant Hand right   Usual Activity Tolerance good   Current Activity Tolerance moderate   Regular Exercise yes   Activity/Exercise Type walking;strength training   Exercise Amount/Frequency 30 mins;3-5 times/wk   Equipment Currently Used at Home none   Activity/Exercise/Self-Care Comment Pt reports he walks on treadmill at speed 4.6mph for 12-14 mins and then does weight circuits for approx 20 mins 4x/week   Functional Level Prior   Ambulation 0-->independent   Transferring 0-->independent   Fall history within last six months no   Prior Functional Level 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 Pt to cath lab 10/18/18 and had 2 stents placed to mid circumflex into OM 3. Pt has a family history of early heart disease.   Heart Disease Risk Factors Family history;Medical history;Gender;Dislipidemia   General Info Comments Activity: ambulate   Cognitive Status Examination   Orientation orientation to person, place and time   Level of Consciousness alert   Pain Assessment   Patient Currently in Pain No   Integumentary/Edema   Integumentary/Edema no deficits were identifed   Posture    Posture Not impaired   Range of Motion (ROM)   ROM Comment B LE ROM WFL  "  Strength   Strength Comments B LE strength WFL   Bed Mobility   Bed Mobility Comments Independent   Transfer Skills   Transfer Comments Independent   Gait   Gait Comments Independent   Balance   Balance Comments Good   Sensory Examination   Sensory Perception Comments Denies numbness/tingling   General Therapy Interventions   Planned Therapy Interventions risk factor education;home program guidelines;progressive activity/exercise   Clinical Impression   Criteria for Skilled Therapeutic Intervention yes, treatment indicated   PT Diagnosis Impaired aerobic activity tolerance   Influenced by the following impairments Decreased activity tolerance   Functional limitations due to impairments Decreased ability to participate in daily tasks   Clinical Presentation Stable/Uncomplicated   Clinical Presentation Rationale Current status, [facility]   Clinical Decision Making (Complexity) Low complexity   Therapy Frequency` 2 times/day   Predicted Duration of Therapy Intervention (days/wks) 3 days   Anticipated Discharge Disposition Home with Outpatient Therapy   Risk & Benefits of therapy have been explained Yes   Patient, Family & other staff in agreement with plan of care Yes   [facility] TM \"6 Clicks\"   2016, Trustees of [facility], under license to [employer].  All rights reserved.   6 Clicks Short Forms Basic Mobility Inpatient Short Form   [facility]  \"6 Clicks\" V.2 Basic Mobility Inpatient Short Form   1. Turning from your back to your side while in a flat bed without using bedrails? 4 - None   2. Moving from lying on your back to sitting on the side of a flat bed without using bedrails? 4 - None   3. Moving to and from a bed to a chair (including a wheelchair)? 4 - None   4. Standing up from a chair using your arms (e.g., wheelchair, or bedside chair)? 4 - None   5. To walk in hospital room? 4 - None   6. Climbing 3-5 steps with a railing? 4 - None   Basic Mobility Raw Score (Score out of " 24.Lower scores equate to lower levels of function) 24   Total Evaluation Time   Total Evaluation Time (Minutes) 10

## 2018-10-19 NOTE — PLAN OF CARE
Problem: Patient Care Overview  Goal: Plan of Care/Patient Progress Review  Outcome: No Change  A/O. VSS on RA, ex: SBP 80's-90's. Pt denies lightheadedness/dizziness. GARZON. Tele: SR, HR 80's-100's. PRN tylenol given q4h for headache and pain at incision site with improvement. Temp 99.5. Right groin site post-angio C/D/I, CMS intact. Plan for cardiac rehab.

## 2018-10-19 NOTE — PROGRESS NOTES
[facility]  Hospitalist Progress Note    Date of Service (when I saw the patient): 10/19/2018  Neptali Duong is a 43 year old male who was admitted on 10/17/2018.     Assessment & Plan       1-Acute NSTEMI 2-3 days PTA.  Ischemic symptoms resolved PTA with ongoing congestive heart failure (dyspnea) since.  Echo shows extensive inferolateral hypokinesis and some inferior hypokinesis.  Ejection fraction 50%, down trending trops.  Angiogram revealed a single-vessel disease, drug-eluting stent placed in the circumflex artery which had a significant distribution.  No recurrent chest pressure.    2-Acute systolic congestive heart failure, secondary to acute MI.  Much improved, but low BP's post Lasix and carvedilol.  Holding diuretics today, monitor blood pressure and response to cardiac rehab.    3.  Mild leukocytosis with history of myalgias and chills.  Fever yesterday afternoon without recurrence, fluids a testing negative, blood cultures negative so far.  Mild elevation in transaminases, mild chills and stress headache-this could all relate to a viral illness.  Recheck hepatic panel in the a.m.  Wife was sick with viral symptoms just prior to patient's admission.    4.-Dyslipidemia, on statin therapy      5-Chronic renal insufficiency, creatinine of 1.2.    Mild rise in creatinine.  Hypophosphatemia, oral replacement initiated         DVT Prophylaxis: Heparin  Code Status: Full Code    Disposition: Expected discharge in 1 day once passes cardiac rehab, blood pressure stabilizes as well as no recurrent significant heart failure symptoms.    Earl Braswell MD       Interval History   Patient denies any recurrent chest discomfort.  His dyspnea is remarkably improved compared to the prior 2 days.  Denies lightheadedness or dizziness.  Nurses note occasional chills and mild headache.    -Data reviewed today: I reviewed all new labs and imaging results over the last 24 hours.    Physical Exam   Temp: 98.8  F  (37.1  C) Temp src: Oral BP: 95/61 Pulse: 92 Heart Rate: 92 Resp: 17 SpO2: 98 % O2 Device: None (Room air)    Vitals:    10/17/18 1000 10/18/18 0540 10/19/18 0545   Weight: 69.9 kg (154 lb 2.1 oz) 69.5 kg (153 lb 3.2 oz) 69 kg (152 lb 3.2 oz)     Vital Signs with Ranges  Temp:  [98  F (36.7  C)-101  F (38.3  C)] 98.8  F (37.1  C)  Pulse:  [] 92  Heart Rate:  [] 92  Resp:  [10-29] 17  BP: ()/(47-72) 95/61  SpO2:  [94 %-99 %] 98 %  I/O last 3 completed shifts:  In: 1891.75 [P.O.:680; I.V.:1211.75]  Out: 1500 [Urine:1500]    Constitutional: alert and oriented, no acute respiratory distress  Respiratory: Lungs clear to auscultation, no wheeze or crackles  Cardiovascular: Regular rate and rhythm, no murmur or rub  GI: Non distended, normal BS, no organomegaly,masses or tenderness  Skin/Integumen: No edema, normal pulse and color  Neuro: No focal deficits    Medications     - MEDICATION INSTRUCTIONS -       - MEDICATION INSTRUCTIONS -       - MEDICATION INSTRUCTIONS -       Percutaneous Coronary Intervention orders placed (this is information for BPA alerting)       Reason ACE/ARB/ARNI order not selected         aspirin  81 mg Oral Daily     atorvastatin  40 mg Oral QPM     carvedilol  6.25 mg Oral BID w/meals     furosemide  40 mg Oral Daily     insulin aspart  1-4 Units Subcutaneous Q4H     potassium phosphate (monobasic)  500 mg Oral 4x Daily w/meals     sodium chloride (PF)  3 mL Intracatheter Q8H     ticagrelor  90 mg Oral Q12H       Data     Recent Labs  Lab 10/19/18  0535 10/18/18  0950 10/18/18  0526 10/17/18  0526   WBC 12.1*  --  14.5* 11.9*   HGB 11.5*  --  12.5* 14.7     --  183 236     --  139 138   POTASSIUM 3.7  --  4.0 3.9   BUN 20  --  17 14   CR 1.28*  --  1.15 1.22   THOMAS 8.3*  --  8.5 8.6   *  --  123* 126*   ALBUMIN  --  3.4  --   --    ALT  --  72*  --   --    AST  --  276*  --   --        Imaging: No results found for this or any previous visit (from the past 24  hour(s)).

## 2018-10-19 NOTE — PLAN OF CARE
Problem: Patient Care Overview  Goal: Plan of Care/Patient Progress Review  Discharge Planner PT   Patient plan for discharge: Home  Current status: Pt is 43 year old male adm on 10/17/18 with NSTEMI, to cath lab 10/18/18 and had 2 stents placed. At baseline pt is independent and active, exercising 4x/week, lives with spouse in two story home, works full time in computer programming. CR orders received, evaluation completed and treatment initiated. Pt able to ambulate 10 mins on treadmill at speed 2.5mph, vital signs stable, heart rate max 131 during treadmill ambulation, remaining mostly 115-122. Pt denies symptoms with activity. Cardiac rehab education initiated.  Barriers to return to prior living situation: None  Recommendations for discharge: Home with outpatient cardiac rehab  Rationale for recommendations: Pt is able to perform mobility tasks necessary for discharge to home, would benefit from continued education and monitoring during aerobic activity for optimal functional outcomes.       Entered by: Rosy Parker 10/19/2018 9:30 AM

## 2018-10-20 ENCOUNTER — APPOINTMENT (OUTPATIENT)
Dept: PHYSICAL THERAPY | Facility: CLINIC | Age: 43
End: 2018-10-20
Attending: HOSPITALIST
Payer: COMMERCIAL

## 2018-10-20 ENCOUNTER — APPOINTMENT (OUTPATIENT)
Dept: CT IMAGING | Facility: CLINIC | Age: 43
End: 2018-10-20
Attending: INTERNAL MEDICINE
Payer: COMMERCIAL

## 2018-10-20 VITALS
WEIGHT: 153.1 LBS | HEART RATE: 72 BPM | SYSTOLIC BLOOD PRESSURE: 120 MMHG | DIASTOLIC BLOOD PRESSURE: 78 MMHG | RESPIRATION RATE: 18 BRPM | HEIGHT: 65 IN | TEMPERATURE: 100.3 F | BODY MASS INDEX: 25.51 KG/M2 | OXYGEN SATURATION: 96 %

## 2018-10-20 LAB
ALBUMIN SERPL-MCNC: 2.9 G/DL (ref 3.4–5)
ALP SERPL-CCNC: 85 U/L (ref 40–150)
ALT SERPL W P-5'-P-CCNC: 48 U/L (ref 0–70)
ANION GAP SERPL CALCULATED.3IONS-SCNC: 7 MMOL/L (ref 3–14)
AST SERPL W P-5'-P-CCNC: 74 U/L (ref 0–45)
BASOPHILS # BLD AUTO: 0 10E9/L (ref 0–0.2)
BASOPHILS NFR BLD AUTO: 0.1 %
BILIRUB DIRECT SERPL-MCNC: 0.5 MG/DL (ref 0–0.2)
BILIRUB SERPL-MCNC: 1.3 MG/DL (ref 0.2–1.3)
BUN SERPL-MCNC: 15 MG/DL (ref 7–30)
CALCIUM SERPL-MCNC: 8.1 MG/DL (ref 8.5–10.1)
CHLORIDE SERPL-SCNC: 103 MMOL/L (ref 94–109)
CO2 SERPL-SCNC: 24 MMOL/L (ref 20–32)
CREAT SERPL-MCNC: 1.15 MG/DL (ref 0.66–1.25)
DIFFERENTIAL METHOD BLD: ABNORMAL
EOSINOPHIL # BLD AUTO: 0 10E9/L (ref 0–0.7)
EOSINOPHIL NFR BLD AUTO: 0.2 %
ERYTHROCYTE [DISTWIDTH] IN BLOOD BY AUTOMATED COUNT: 13.2 % (ref 10–15)
GFR SERPL CREATININE-BSD FRML MDRD: 69 ML/MIN/1.7M2
GLUCOSE BLDC GLUCOMTR-MCNC: 101 MG/DL (ref 70–99)
GLUCOSE BLDC GLUCOMTR-MCNC: 219 MG/DL (ref 70–99)
GLUCOSE BLDC GLUCOMTR-MCNC: 98 MG/DL (ref 70–99)
GLUCOSE SERPL-MCNC: 106 MG/DL (ref 70–99)
HCT VFR BLD AUTO: 32.1 % (ref 40–53)
HGB BLD-MCNC: 11.1 G/DL (ref 13.3–17.7)
IMM GRANULOCYTES # BLD: 0 10E9/L (ref 0–0.4)
IMM GRANULOCYTES NFR BLD: 0.3 %
INTERPRETATION ECG - MUSE: NORMAL
LYMPHOCYTES # BLD AUTO: 0.8 10E9/L (ref 0.8–5.3)
LYMPHOCYTES NFR BLD AUTO: 7.3 %
MAGNESIUM SERPL-MCNC: 2.1 MG/DL (ref 1.6–2.3)
MCH RBC QN AUTO: 29.6 PG (ref 26.5–33)
MCHC RBC AUTO-ENTMCNC: 34.6 G/DL (ref 31.5–36.5)
MCV RBC AUTO: 86 FL (ref 78–100)
MONOCYTES # BLD AUTO: 0.8 10E9/L (ref 0–1.3)
MONOCYTES NFR BLD AUTO: 7.8 %
NEUTROPHILS # BLD AUTO: 8.9 10E9/L (ref 1.6–8.3)
NEUTROPHILS NFR BLD AUTO: 84.3 %
NRBC # BLD AUTO: 0 10*3/UL
NRBC BLD AUTO-RTO: 0 /100
PHOSPHATE SERPL-MCNC: 2 MG/DL (ref 2.5–4.5)
PLATELET # BLD AUTO: 192 10E9/L (ref 150–450)
POTASSIUM SERPL-SCNC: 3.7 MMOL/L (ref 3.4–5.3)
PROT SERPL-MCNC: 6.6 G/DL (ref 6.8–8.8)
RBC # BLD AUTO: 3.75 10E12/L (ref 4.4–5.9)
SODIUM SERPL-SCNC: 134 MMOL/L (ref 133–144)
WBC # BLD AUTO: 10.5 10E9/L (ref 4–11)

## 2018-10-20 PROCEDURE — 93010 ELECTROCARDIOGRAM REPORT: CPT | Performed by: INTERNAL MEDICINE

## 2018-10-20 PROCEDURE — 71250 CT THORAX DX C-: CPT

## 2018-10-20 PROCEDURE — 99207 ZZC NON-BILLABLE SERV PER CHARTING: CPT | Performed by: INTERNAL MEDICINE

## 2018-10-20 PROCEDURE — 25000132 ZZH RX MED GY IP 250 OP 250 PS 637: Performed by: INTERNAL MEDICINE

## 2018-10-20 PROCEDURE — 85025 COMPLETE CBC W/AUTO DIFF WBC: CPT | Performed by: INTERNAL MEDICINE

## 2018-10-20 PROCEDURE — 80076 HEPATIC FUNCTION PANEL: CPT | Performed by: INTERNAL MEDICINE

## 2018-10-20 PROCEDURE — 80048 BASIC METABOLIC PNL TOTAL CA: CPT | Performed by: INTERNAL MEDICINE

## 2018-10-20 PROCEDURE — 93005 ELECTROCARDIOGRAM TRACING: CPT

## 2018-10-20 PROCEDURE — 00000146 ZZHCL STATISTIC GLUCOSE BY METER IP

## 2018-10-20 PROCEDURE — 97110 THERAPEUTIC EXERCISES: CPT | Mod: GP

## 2018-10-20 PROCEDURE — 84100 ASSAY OF PHOSPHORUS: CPT | Performed by: INTERNAL MEDICINE

## 2018-10-20 PROCEDURE — 83735 ASSAY OF MAGNESIUM: CPT | Performed by: INTERNAL MEDICINE

## 2018-10-20 PROCEDURE — 40000193 ZZH STATISTIC PT WARD VISIT

## 2018-10-20 PROCEDURE — 25000132 ZZH RX MED GY IP 250 OP 250 PS 637: Performed by: STUDENT IN AN ORGANIZED HEALTH CARE EDUCATION/TRAINING PROGRAM

## 2018-10-20 PROCEDURE — 36415 COLL VENOUS BLD VENIPUNCTURE: CPT | Performed by: INTERNAL MEDICINE

## 2018-10-20 PROCEDURE — 99232 SBSQ HOSP IP/OBS MODERATE 35: CPT | Performed by: INTERNAL MEDICINE

## 2018-10-20 RX ORDER — METOPROLOL SUCCINATE 25 MG/1
25 TABLET, EXTENDED RELEASE ORAL 2 TIMES DAILY
Status: DISCONTINUED | OUTPATIENT
Start: 2018-10-20 | End: 2018-10-20 | Stop reason: HOSPADM

## 2018-10-20 RX ORDER — COLCHICINE 0.6 MG/1
0.6 TABLET ORAL 2 TIMES DAILY
Status: DISCONTINUED | OUTPATIENT
Start: 2018-10-20 | End: 2018-10-20 | Stop reason: HOSPADM

## 2018-10-20 RX ORDER — ATORVASTATIN CALCIUM 20 MG/1
20 TABLET, FILM COATED ORAL EVERY EVENING
Status: DISCONTINUED | OUTPATIENT
Start: 2018-10-20 | End: 2018-10-20 | Stop reason: HOSPADM

## 2018-10-20 RX ORDER — COLCHICINE 0.6 MG/1
0.6 TABLET ORAL 2 TIMES DAILY
Qty: 28 TABLET | Refills: 0 | Status: SHIPPED | OUTPATIENT
Start: 2018-10-20 | End: 2018-10-26

## 2018-10-20 RX ORDER — METOPROLOL SUCCINATE 25 MG/1
25 TABLET, EXTENDED RELEASE ORAL 2 TIMES DAILY
Qty: 60 TABLET | Refills: 0 | Status: SHIPPED | OUTPATIENT
Start: 2018-10-20 | End: 2018-10-23

## 2018-10-20 RX ORDER — ATORVASTATIN CALCIUM 10 MG/1
20 TABLET, FILM COATED ORAL EVERY EVENING
Qty: 30 TABLET | Refills: 0 | Status: SHIPPED | OUTPATIENT
Start: 2018-10-20 | End: 2018-10-23

## 2018-10-20 RX ADMIN — TICAGRELOR 90 MG: 90 TABLET ORAL at 01:02

## 2018-10-20 RX ADMIN — COLCHICINE 0.6 MG: 0.6 TABLET, FILM COATED ORAL at 10:51

## 2018-10-20 RX ADMIN — METOPROLOL SUCCINATE 25 MG: 25 TABLET, EXTENDED RELEASE ORAL at 10:52

## 2018-10-20 RX ADMIN — ACETAMINOPHEN 650 MG: 325 TABLET, FILM COATED ORAL at 05:25

## 2018-10-20 RX ADMIN — ACETAMINOPHEN 650 MG: 325 TABLET, FILM COATED ORAL at 16:43

## 2018-10-20 RX ADMIN — TICAGRELOR 90 MG: 90 TABLET ORAL at 13:34

## 2018-10-20 RX ADMIN — ASPIRIN 81 MG: 81 TABLET, COATED ORAL at 10:51

## 2018-10-20 ASSESSMENT — PAIN DESCRIPTION - DESCRIPTORS: DESCRIPTORS: ACHING;PRESSURE

## 2018-10-20 NOTE — PROGRESS NOTES
RECEIVING UNIT ED HANDOFF REVIEW    ED Nurse Handoff Report was reviewed by: Tea Webb on October 20, 2018 at 11:29 AM

## 2018-10-20 NOTE — PLAN OF CARE
Problem: Patient Care Overview  Goal: Plan of Care/Patient Progress Review  Outcome: No Change  Pt given tylenol for headache, rating 2/10 pain, and fever. Pt able to make needs known. Independent in room. No complains of CP, SOB.

## 2018-10-20 NOTE — PLAN OF CARE
Problem: Patient Care Overview  Goal: Plan of Care/Patient Progress Review  Pt A&Ox4, BP's slightly soft 90's-100's sys, otherwise VSS-afebrile. Tele SR. Pt denies CP, dizziness, and SOB. Pt c/o mild chest pressure when taking a deep breath, EKG obtained-remains unchanged, pt received tylenol w/ good relief. R groin site CDI, CMS intact. Plan for discharge today. Will continue to monitor.

## 2018-10-20 NOTE — PLAN OF CARE
Problem: Patient Care Overview  Goal: Plan of Care/Patient Progress Review  Discharge Planner PT   Patient plan for discharge: Home with outpatient cardiac rehab  Current status: Pt is independent with all mobility. Pt able to ambulate x15 mins on treadmill at speed 2.5mph. Heart rate max 124 during session, otherwise vital signs stable, please refer to vital signs flow sheet. Pt denies any symptoms throughout. Pt receptive and demonstrates understanding of cardiac rehab education.  Barriers to return to prior living situation: None  Recommendations for discharge: Home with outpatient cardiac rehab  Rationale for recommendations: Recommend continued monitoring with activity and education for optimal functional recovery.       Entered by: Rosy Parker 10/20/2018 12:57 PM

## 2018-10-20 NOTE — PROGRESS NOTES
Patient prefers appointment with internal medicine MD at [facility]

## 2018-10-20 NOTE — PLAN OF CARE
Problem: Patient Care Overview  Goal: Plan of Care/Patient Progress Review  Outcome: Improving  Alert and oriented x 4. BP amd HR stable, but currently has a temp of 100.5. No Tylenol given at this point to see if it resolves. Tele SR- ST with rates in the 80's -120's with activity. He is tolerating medication additions and was given information about those meds today. Right groin site clean dry and intact. Plan for possible disharge today pending temps and medication changes

## 2018-10-20 NOTE — DISCHARGE INSTRUCTIONS

## 2018-10-20 NOTE — PLAN OF CARE
Problem: Patient Care Overview  Goal: Plan of Care/Patient Progress Review  Outcome: No Change  Pt improving from cardiology standpoint but cont to spike low grade fevers with soft BP's 80-90'/50's. Triggered sepsis protocol, awaiting LA result. Cont to monitor.

## 2018-10-20 NOTE — PROGRESS NOTES
[facility]    Cardiology Progress Note     Assessment & Plan   Neptali Duong is a 43 year old male who was admitted on 10/17/2018.  Cardiology was consulted for myocardial infarction.    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 Duong is doing quite well.  While I do not hear a rub on physical exam, I do suspect he probably has underlying pericarditis in the setting of a delayed presentation for myocardial infarction.  He reports chest pain that is worse lying down than sitting up.  He was standing up and it absolutely no chest pain at all.  He has some mild pleuritic chest pain as well particular when is lying down.    Given probable pericarditis, I am going to start him on colchicine 0.6 mg twice a day.  With his recent myocardial infarction I would like to avoid NSAIDs if at all possible.  We could try aspirin but given he is already on dual antiplatelet therapy, I think this may lead to an overall elevated bleeding risk.  It appears to be mild right now so I think that monotherapy with colchicine will be effective.  We will reevaluate this is an outpatient to ensure that it is resolving.    Otherwise, will plan on dismissal either later today or tomorrow morning.  He continues to have an intermittent fever but these are gradually spacing out.  This is potentially due to viral illness versus may be an additional component from the pericarditis.  He will observe this over the weekend to ensure that it continues to improve.    Were going to dismiss him on metoprolol, aspirin, atorvastatin, and ticagrelor.  His blood pressures have been borderline and so I do not think he did tolerate an ACE inhibitor at this point.  We will reevaluate potentially starting an ACE inhibitor as an outpatient.    Kermit Dahl MD    Interval History   Mr. Duong is doing quite well today.  He is having  some pleuritic chest pain that is worse lying down and sitting up.  He denies any dyspnea.  He has been up ambulating and doing well in that regards.    Physical Exam   Temp: 98.2  F (36.8  C) Temp src: Oral BP: 100/69 Pulse: 72 Heart Rate: 90 Resp: 18 SpO2: 96 % O2 Device: None (Room air)    Vitals:    10/18/18 0540 10/19/18 0545 10/20/18 0500   Weight: 69.5 kg (153 lb 3.2 oz) 69 kg (152 lb 3.2 oz) 69.4 kg (153 lb 1.6 oz)     Vital Signs with Ranges  Temp:  [98.2  F (36.8  C)-101.7  F (38.7  C)] 98.2  F (36.8  C)  Pulse:  [72-78] 72  Heart Rate:  [] 90  Resp:  [16-18] 18  BP: ()/(53-69) 100/69  SpO2:  [96 %-100 %] 96 %  I/O last 3 completed shifts:  In: 400 [P.O.:400]  Out: 500 [Urine:500]    Constitutional: No apparent distress.   Eyes: No xanthelasma or conjunctivitis  Respiratory: Clear to auscultation bilaterally. No crackles or wheezes.  Cardiovascular: Regular rate and rhythm. Normal S1 and S2. No murmurs. No extra heart sounds. No JVD.   Extremities: No peripheral edema.  Neurologic: Moving all extremities. No facial assymmetry.  Psychiatric: Alert and oriented. Answers questions appropriately.     Medications     - MEDICATION INSTRUCTIONS -       - MEDICATION INSTRUCTIONS -       - MEDICATION INSTRUCTIONS -       Percutaneous Coronary Intervention orders placed (this is information for BPA alerting)       Reason ACE/ARB/ARNI order not selected         aspirin  81 mg Oral Daily     atorvastatin  40 mg Oral QPM     insulin aspart  1-4 Units Subcutaneous Q4H     metoprolol succinate  25 mg Oral BID     sodium chloride (PF)  3 mL Intracatheter Q8H     ticagrelor  90 mg Oral Q12H       Data   Results for orders placed or performed during the hospital encounter of 10/17/18 (from the past 24 hour(s))   EKG 12-lead, tracing only   Result Value Ref Range    Interpretation ECG Click View Image link to view waveform and result    Glucose by meter   Result Value Ref Range    Glucose 135 (H) 70 - 99 mg/dL    Phosphorus   Result Value Ref Range    Phosphorus 1.9 (L) 2.5 - 4.5 mg/dL   Glucose by meter   Result Value Ref Range    Glucose 118 (H) 70 - 99 mg/dL   Lactic acid level STAT for sepsis protocol   Result Value Ref Range    Lactate for Sepsis Protocol 0.9 0.7 - 2.0 mmol/L   Glucose by meter   Result Value Ref Range    Glucose 150 (H) 70 - 99 mg/dL   Glucose by meter   Result Value Ref Range    Glucose 98 70 - 99 mg/dL   Hepatic panel   Result Value Ref Range    Bilirubin Direct 0.5 (H) 0.0 - 0.2 mg/dL    Bilirubin Total 1.3 0.2 - 1.3 mg/dL    Albumin 2.9 (L) 3.4 - 5.0 g/dL    Protein Total 6.6 (L) 6.8 - 8.8 g/dL    Alkaline Phosphatase 85 40 - 150 U/L    ALT 48 0 - 70 U/L    AST 74 (H) 0 - 45 U/L   Basic metabolic panel   Result Value Ref Range    Sodium 134 133 - 144 mmol/L    Potassium 3.7 3.4 - 5.3 mmol/L    Chloride 103 94 - 109 mmol/L    Carbon Dioxide 24 20 - 32 mmol/L    Anion Gap 7 3 - 14 mmol/L    Glucose 106 (H) 70 - 99 mg/dL    Urea Nitrogen 15 7 - 30 mg/dL    Creatinine 1.15 0.66 - 1.25 mg/dL    GFR Estimate 69 >60 mL/min/1.7m2    GFR Estimate If Black 83 >60 mL/min/1.7m2    Calcium 8.1 (L) 8.5 - 10.1 mg/dL   CBC with platelets differential   Result Value Ref Range    WBC 10.5 4.0 - 11.0 10e9/L    RBC Count 3.75 (L) 4.4 - 5.9 10e12/L    Hemoglobin 11.1 (L) 13.3 - 17.7 g/dL    Hematocrit 32.1 (L) 40.0 - 53.0 %    MCV 86 78 - 100 fl    MCH 29.6 26.5 - 33.0 pg    MCHC 34.6 31.5 - 36.5 g/dL    RDW 13.2 10.0 - 15.0 %    Platelet Count 192 150 - 450 10e9/L    Diff Method Automated Method     % Neutrophils 84.3 %    % Lymphocytes 7.3 %    % Monocytes 7.8 %    % Eosinophils 0.2 %    % Basophils 0.1 %    % Immature Granulocytes 0.3 %    Nucleated RBCs 0 0 /100    Absolute Neutrophil 8.9 (H) 1.6 - 8.3 10e9/L    Absolute Lymphocytes 0.8 0.8 - 5.3 10e9/L    Absolute Monocytes 0.8 0.0 - 1.3 10e9/L    Absolute Eosinophils 0.0 0.0 - 0.7 10e9/L    Absolute Basophils 0.0 0.0 - 0.2 10e9/L    Abs Immature Granulocytes  0.0 0 - 0.4 10e9/L    Absolute Nucleated RBC 0.0    Magnesium   Result Value Ref Range    Magnesium 2.1 1.6 - 2.3 mg/dL   Phosphorus   Result Value Ref Range    Phosphorus 2.0 (L) 2.5 - 4.5 mg/dL   EKG 12-lead, tracing only   Result Value Ref Range    Interpretation ECG Click View Image link to view waveform and result    Glucose by meter   Result Value Ref Range    Glucose 101 (H) 70 - 99 mg/dL

## 2018-10-21 NOTE — DISCHARGE SUMMARY
Admit Date:     10/17/2018   Discharge Date:     10/20/2018      DISCHARGE DIAGNOSES:   1.  Acute inferolateral myocardial infarction.   2.  Mild intermittent fevers, most likely a CMV virus.  Extensive evaluation for other potential etiologies negative.  Would consider Lyme disease testing as an outpatient if fevers continue when titers may be diagnostic.   3.  Acute systolic congestive heart failure secondary to acute MI with marked shortness of breath and dyspnea.   4.  Dyslipidemia.     5.  Mild fatty liver.     6.  Mild hypophosphatemia, mild hypoalbuminemia as well as mild hepatic inflammation, most likely secondary to virus.   7.  Prediabetes.  A1c 5.9.      SUMMARY OF HOSPITAL COURSE:   The patient is a 43-year-old male who presented to the hospital with dyspnea, leg chest discomfort as well as generalized body aches, chills with a history of his wife being recently ill with flu-like symptoms.  The patient's troponin level was approximately 100 and he was felt to have a recent acute myocardial infarction and due to patient's profound dyspnea, coinciding with acute systolic congestive heart failure.  Echocardiogram revealed normal left ventricular size with ejection fraction of 50% with mild inferolateral wall hypokinesis and normal diastolic function.  The patient underwent coronary angiography revealing a dominant right coronary artery, normal left main, large caliber LAD with minimal distal disease less than 25% and mid LAD, 30-40%.  The left circumflex is a large caliber vessel which had a mid eccentric 90% stenosis due to thrombus and some distal 50-60% stenosis.  The OM had a proximal 70% stenosis and the OM4 had 50% stenosis.  The right coronary artery had proximal 30% stenosis.  The rest of the vessel was less than 25%.  The patient had successful percutaneous intervention of the mid circumflex into the OM 3 with overlapping 3 x 20 mm and 2.5 x 28 mm Synergy drug-eluting stents.  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  Plan was to take aspirin 81 mg indefinitely and take Brilinta 90 mg twice daily for at least 12 months uninterrupted.      The patient had myalgias and chills prior to admission with a family member being recently ill.  Admission white blood count was 14.5 thousand.  This slowly improved during the hospital stay down to 10.5 thousand at discharge.  Urinalysis showed a small amount of proteinuria and some ketones suggestive of dehydration, but no signs of infection.  Admission chest x-ray showed no active disease and a followup CT of the chest on 10/20/2018 revealed some linear atelectasis or fibrosis at the bases.  No evidence of pneumonia or consolidation.  Also noted was some borderline fatty hepatic fatty infiltration.  Influenza testing was negative.  The patient did not have an earache, sore throat.  Oropharynx was clear.  The patient had mild frontal occipital headaches that are not unusual for him and a stress headache area.  The patient did have a minimal chest discomfort with deep breath and felt a little better when sitting up.  There was no clear rub noted on cardiac exam and it was felt that the patient may have early pericarditis and he was placed on colchicine 0.6 mg twice daily for 2 weeks.  This can be discontinued during followup visits if symptoms have resolved.  Otherwise, patient denied any dysuria, urinary frequency, skin rashes, recent tick bites.  Due to patient's ongoing recurrent mild fever, it appears the patient has a virus in the CMV /mono type virus that can last for several days and weeks.  The patient otherwise clinically is doing well and is recommended to utilize Tylenol for low-grade fever.  The patient does go on walks in [place] and there is a chance he was exposed to a tick bite.  Testing for this would be more diagnostic as an outpatient when titers may  start developing.       In addition to patient's coronary disease, the patient has significant peripheral vascular disease as noted during the angiogram with the iliac stenosis.  The patient will be on high-dose statins but initially only 20 mg of atorvastatin daily while taking colchicine, as this can increase drug levels of this medication.  Once the colchicine has been completed, the patient should increase atorvastatin to 40 mg daily.      DISCHARGE MEDICATIONS:   1.  Atorvastatin 20 mg daily.   2.  Aspirin 81 mg daily.   3.  Colchicine 0.6 mg twice daily for 2 weeks,   4.  metoprolol 25 mg twice daily - patient initially was started on carvedilol 6.25 mg twice daily, but had relative tachycardia with low blood pressures ranging from 90 systolic to 105 systolic and for this reason was transitioned from carvedilol to metoprolol and can be followed up as an outpatient.   5.  Brilinta 90 mg twice daily for at least 1 year.        The patient is to follow up with [facility] primary care, Dr. Izabela Keller on 10/23/2018 at 10:40 a.m.  The patient will have approximately an hour later an initial appointment with cardiac rehab and then on Friday, will have followup lab appointment and visit with [facility] team.  The patient can continue to use Tylenol as needed for low-grade fever, but is not going to be treated with NSAIDs due to recent acute myocardial infarction.  The patient's diet will be low fat regular diet, activity, normal walking, avoiding weight lifting or running until medically cleared by Cardiology.         SARAH MEDLEY MD             D: 10/20/2018   T: 10/20/2018   MT: LISET      Name:     JESSICA ST   MRN:      [id number]        Account:        [id number]   :      1975           Admit Date:     10/17/2018                                  Discharge Date: 10/20/2018      Document: [id number]       cc: Izabela Keller MD

## 2018-10-21 NOTE — PLAN OF CARE
Problem: Patient Care Overview  Goal: Plan of Care/Patient Progress Review  Cardiac Rehab Discharge Summary    Reason for therapy discharge:    Discharged to home with outpatient therapy.    Progress towards therapy goal(s). See goals on Care Plan in [facility] electronic health record for goal details.  Goals partially met.  Barriers to achieving goals:   discharge from facility.    Therapy recommendation(s):    Continued therapy is recommended.  Rationale/Recommendations:  outpatient cardiac rehab for continued monitoring during activity and education.

## 2018-10-21 NOTE — PROGRESS NOTES
Temp 100.3 F with muscles aches at discharge, MD notified, ok to discharge with recommended tylenol, also on colchicine. All discharge instructions, prescriptions, and personal belongings given to pt. Reviewed med schedule in depth. All questions answered. Pt d/c to home via family.

## 2018-10-22 ENCOUNTER — TELEPHONE (OUTPATIENT)
Dept: CARDIOLOGY | Facility: CLINIC | Age: 43
End: 2018-10-22

## 2018-10-22 ENCOUNTER — TELEPHONE (OUTPATIENT)
Dept: INTERNAL MEDICINE | Facility: CLINIC | Age: 43
End: 2018-10-22

## 2018-10-22 NOTE — TELEPHONE ENCOUNTER
Esperanza, with [facility] calls. Patient has appointment to establish care with Dr. Keller following hospital stay - diagnosed with NSTEMI.     Esperanza called patient today for follow-up. She states patient was having persistent fevers while in the hospital and these have continued at home - ranges from 101-102 F at home. Patient is taking Tylenol 1000 mg every 6 hours, fever comes back along with headache and upper body aches when Tylenol wears off. Patient is not coughing and only reports SOB when walking up stairs, did not complain of any neck stiffness or sensitivity to light and did not sound confused over the phone. Esperanza asks if our office would recommend he go to the ER today or move appointment up to today instead of waiting until tomorrow. Advised Esperanza it would be okay to wait for appointment tomorrow to be evaluated. She will inform patient of this.     Will forward to Dr. Keller to review Esperanza's notes from today.

## 2018-10-22 NOTE — TELEPHONE ENCOUNTER
"Patient was evaluated by cardiology while inpatient for 3 day hx of \"heartburn\", progressively increased SOB and increased diaphoresis with exertion. NSTEMI with echo revealing inferolateral hypokinesis. 10/18/18: PCI with JOHN to mCFx, OM2 and OM3. Pt continued to have \"pleuritic\" type chest pain with auscultated rub, that would increase when lying, post JOHN placement. Started on Colchicine for Pericarditis. Called patient to discuss any post hospital d/c questions he may have, review medication changes, and confirm f/u appts. RN confirmed with patient that they were d/c with their antiplatelet Brilinta. Patient denied any questions regarding new medications or changes with their PTA medications, and has been taking Colchicine as ordered. Pt continues to c/o  fever up to 102 degrees, that has persisted since hospitalization. Fever accompanied with body aches to sunni anterior chest and down sunni arms, chills, HA and some GARZON when climbing stairs. Symptoms relieved with Acetaminophen 1000 mg po every 6 hrs, but continues to return when acetaminophen begins to wear off. Denies productive cough, dysuria, N/V, neck stiffness, photophobia or diarrhea. Wife had similar s/s prior to pt developing symptoms. See IP internal med notes. Preliminary BC are negative and WBC leukocytosis resolved by 10/20/18.  States traveled to [place] one month ago, but no other recent out of states travel.  RFA cardiac cath site is without bleeding, swelling, redness or tenderness. RN confirmed with patient that he has an apt scheduled tomorrow to establish care with IM MD Dr. Izabela Keller, and on 10/26/18 with DAKOTA Uma Longo, with labs prior. Cardiac rehab scheduled 10/23/18. Writer called Dr. Keller's triage RN, Svetlana, and updated her with pt symptoms. Will update Dr. Keller, and OK to wait for scheduled dakota't tomorrow. Patient advised to call clinic with any cardiac related questions or concerns prior to this dakota't. Patient " verbalized understanding and agreed with plan. PINKY Gutierrez RN.

## 2018-10-23 ENCOUNTER — OFFICE VISIT (OUTPATIENT)
Dept: INTERNAL MEDICINE | Facility: CLINIC | Age: 43
End: 2018-10-23
Payer: COMMERCIAL

## 2018-10-23 ENCOUNTER — HOSPITAL ENCOUNTER (OUTPATIENT)
Dept: CARDIAC REHAB | Facility: CLINIC | Age: 43
End: 2018-10-23
Attending: STUDENT IN AN ORGANIZED HEALTH CARE EDUCATION/TRAINING PROGRAM
Payer: COMMERCIAL

## 2018-10-23 VITALS
HEART RATE: 93 BPM | WEIGHT: 151 LBS | DIASTOLIC BLOOD PRESSURE: 68 MMHG | TEMPERATURE: 98.1 F | HEIGHT: 65 IN | SYSTOLIC BLOOD PRESSURE: 120 MMHG | BODY MASS INDEX: 25.16 KG/M2 | OXYGEN SATURATION: 99 %

## 2018-10-23 DIAGNOSIS — Z98.61 STATUS POST PERCUTANEOUS TRANSLUMINAL CORONARY ANGIOPLASTY: ICD-10-CM

## 2018-10-23 DIAGNOSIS — R79.89 ELEVATED LIVER FUNCTION TESTS: ICD-10-CM

## 2018-10-23 DIAGNOSIS — R50.9 FEVER, UNSPECIFIED FEVER CAUSE: ICD-10-CM

## 2018-10-23 DIAGNOSIS — I24.9 ACS (ACUTE CORONARY SYNDROME) (H): Primary | ICD-10-CM

## 2018-10-23 LAB
INTERPRETATION ECG - MUSE: NORMAL
INTERPRETATION ECG - MUSE: NORMAL

## 2018-10-23 PROCEDURE — 99495 TRANSJ CARE MGMT MOD F2F 14D: CPT | Performed by: INTERNAL MEDICINE

## 2018-10-23 PROCEDURE — 93797 PHYS/QHP OP CAR RHAB WO ECG: CPT | Performed by: REHABILITATION PRACTITIONER

## 2018-10-23 PROCEDURE — 40000575 ZZH STATISTIC OP CARDIAC VISIT #2: Performed by: REHABILITATION PRACTITIONER

## 2018-10-23 PROCEDURE — 93798 PHYS/QHP OP CAR RHAB W/ECG: CPT | Performed by: REHABILITATION PRACTITIONER

## 2018-10-23 PROCEDURE — 40000116 ZZH STATISTIC OP CR VISIT: Performed by: REHABILITATION PRACTITIONER

## 2018-10-23 RX ORDER — ATORVASTATIN CALCIUM 10 MG/1
20 TABLET, FILM COATED ORAL EVERY EVENING
Qty: 90 TABLET | Refills: 0 | Status: SHIPPED | OUTPATIENT
Start: 2018-10-23 | End: 2018-10-31

## 2018-10-23 RX ORDER — METOPROLOL SUCCINATE 25 MG/1
25 TABLET, EXTENDED RELEASE ORAL 2 TIMES DAILY
Qty: 180 TABLET | Refills: 0 | Status: SHIPPED | OUTPATIENT
Start: 2018-10-23 | End: 2018-12-06

## 2018-10-23 NOTE — NURSING NOTE
"/68  Pulse 93  Temp 98.1  F (36.7  C) (Oral)  Ht 5' 5\" (1.651 m)  Wt 151 lb (68.5 kg)  SpO2 99%  BMI 25.13 kg/m2    "

## 2018-10-23 NOTE — MR AVS SNAPSHOT
After Visit Summary   10/23/2018    Neptali Duong    MRN: [id number]           Patient Information     Date Of Birth          1975        Visit Information        Provider Department      10/23/2018 10:40 AM Izabela Keller MD [facility]        Today's Diagnoses     ACS (acute coronary syndrome) (H)        Status post percutaneous transluminal coronary angioplasty           Follow-ups after your visit        Follow-up notes from your care team     Return in about 6 months (around 4/23/2019) for Physical Exam.      Your next 10 appointments already scheduled     Oct 26, 2018  9:00 AM CDT   LAB with RU LAB   [facility] [place] ([facility])    [address]  [place] [address]   [phone]           Please do not eat 10-12 hours before your appointment if you are coming in fasting for labs on lipids, cholesterol, or glucose (sugar). This does not apply to pregnant women. Water, hot tea and black coffee (with nothing added) are okay. Do not drink other fluids, diet soda or chew gum.            Oct 26, 2018 10:00 AM CDT   Return Visit with Uma Longo PA-C   [facility] ([facility])    [address]  [place] [id number] [phone]              Who to contact     If you have questions or need follow up information about today's clinic visit or your schedule please contact [facility] directly at [phone].  Normal or non-critical lab and imaging results will be communicated to you by MyChart, letter or phone within 4 business days after the clinic has received the results. If you do not hear from us within 7 days, please contact the clinic through MyChart or phone. If you have a critical or abnormal lab result, we will notify you by phone as soon as possible.  Submit refill requests through [email or web address] or call your pharmacy  "and they will forward the refill request to us. Please allow 3 business days for your refill to be completed.          Additional Information About Your Visit        MyChart Information     [facility] lets you send messages to your doctor, view your test results, renew your prescriptions, schedule appointments and more. To sign up, go to [email or web address] . Click on \"Log in\" on the left side of the screen, which will take you to the Welcome page. Then click on \"Sign up Now\" on the right side of the page.     You will be asked to enter the access code listed below, as well as some personal information. Please follow the directions to create your username and password.     Your access code is: [email or web address]  Expires: 2019  2:38 PM     Your access code will  in 90 days. If you need help or a new code, please call your [place] clinic or [phone].        Care EveryWhere ID     This is your Care EveryWhere ID. This could be used by other organizations to access your [place] medical records  [id number]        Your Vitals Were     Pulse Temperature Height Pulse Oximetry BMI (Body Mass Index)       93 98.1  F (36.7  C) (Oral) 5' 5\" (1.651 m) 99% 25.13 kg/m2        Blood Pressure from Last 3 Encounters:   10/23/18 120/68   10/20/18 120/78   10/17/18 107/63    Weight from Last 3 Encounters:   10/23/18 151 lb (68.5 kg)   10/20/18 153 lb 1.6 oz (69.4 kg)   10/17/18 156 lb (70.8 kg)              Today, you had the following     No orders found for display         Where to get your medicines      These medications were sent to [facility] [place] [address] [address]     Phone:  [phone]     atorvastatin 10 MG tablet    metoprolol succinate 25 MG 24 hr tablet    ticagrelor 90 MG tablet          Primary Care Provider Fax #    Physician No Ref-Primary [phone]       No address on file        Equal Access to Services     ASIM BUSCH " AH: Hadii earnest mendozaasuncionmelanie Oseiali, waaxda luqadaha, qaybta kaalgrover ford, waxmadiha feng chuckvazquez hornersaryvik reynoso. So [facility] [phone].    ATENCIÓN: Si habla español, tiene a boles disposición servicios gratuitos de asistencia lingüística. Llame al [phone].    We comply with applicable federal civil rights laws and [place] laws. We do not discriminate on the basis of race, color, national origin, age, disability, sex, sexual orientation, or gender identity.            Thank you!     Thank you for choosing [facility]  for your care. Our goal is always to provide you with excellent care. Hearing back from our patients is one way we can continue to improve our services. Please take a few minutes to complete the written survey that you may receive in the mail after your visit with us. Thank you!             Your Updated Medication List - Protect others around you: Learn how to safely use, store and throw away your medicines at www.disposemymeds.org.          This list is accurate as of 10/23/18 11:35 AM.  Always use your most recent med list.                   Brand Name Dispense Instructions for use Diagnosis    acetaminophen 500 MG Caps      Take by mouth as needed        aspirin 81 MG tablet      Take 81 mg by mouth daily        atorvastatin 10 MG tablet    LIPITOR    90 tablet    Take 2 tablets (20 mg) by mouth every evening    ACS (acute coronary syndrome) (H)       colchicine 0.6 MG tablet    COLCYRS    28 tablet    Take 1 tablet (0.6 mg) by mouth 2 times daily for 14 days    Pericarditis as complication of acute myocardial infarction (H)       metoprolol succinate 25 MG 24 hr tablet    TOPROL-XL    180 tablet    Take 1 tablet (25 mg) by mouth 2 times daily    Status post percutaneous transluminal coronary angioplasty       ticagrelor 90 MG tablet    BRILINTA    180 tablet    Take 1 tablet (90 mg) by mouth 2 times daily    Status post percutaneous transluminal coronary angioplasty

## 2018-10-23 NOTE — PROGRESS NOTES
"  SUBJECTIVE:   Neptali Duong is a 43 year old male who presents to clinic today for the following health issues:          Hospital Follow-up Visit:    Hospital/Nursing Home/IP Rehab Facility: [facility]  Date of Admission: 10/17/18  Date of Discharge: 10/20/18  Reason(s) for Admission: Acute MI    The patient was admitted with dyspnea and found to have and acute MI.  Cardiology was consulted.  From the discharge summary:  \"Echocardiogram revealed normal left ventricular size with ejection fraction of 50% with mild inferolateral wall hypokinesis and normal diastolic function.  The patient underwent coronary angiography revealing a dominant right coronary artery, normal left main, large caliber LAD with minimal distal disease less than 25% and mid LAD, 30-40%.  The left circumflex is a large caliber vessel which had a mid eccentric 90% stenosis due to thrombus and some distal 50-60% stenosis.  The OM had a proximal 70% stenosis and the OM4 had 50% stenosis.  The right coronary artery had proximal 30% stenosis.  The rest of the vessel was less than 25%.  The patient had successful percutaneous intervention of the mid circumflex into the OM 3 with overlapping 3 x 20 mm and 2.5 x 28 mm Synergy drug-eluting stents\"  Patient was placed on aspirin, brilinta, statin, and metoprolol.    Hospital stay was complicated by a fever.  He received tylenol.   The patient reports he had a headache and muscle aches prior to the MI and then fevers after the stent was placed.  His wife recently had muscle aches and a cough. He had a neg influenza swab.  No recent travel. He had walked under wooded trees in the past.     Patient has not had chest pain or shortness of breath since discharge.  He has had a fever of 101-102.  Today does not have a fever.               Problems taking medications regularly:  None       Medication changes since discharge: None       Problems adhering to non-medication therapy:  None    Summary of " "hospitalization:  [facility] discharge summary reviewed  Diagnostic Tests/Treatments reviewed.  Follow up needed: cardiology  Other Healthcare Providers Involved in Patient s Care:         None  Update since discharge: improved.     Post Discharge Medication Reconciliation: discharge medications reconciled, continue medications without change.  Plan of care communicated with patient     Coding guidelines for this visit:  Type of Medical   Decision Making Face-to-Face Visit       within 7 Days of discharge Face-to-Face Visit        within 14 days of discharge   Moderate Complexity [id number]   High Complexity [id number]              PROBLEMS TO ADD ON...    Problem list and histories reviewed & adjusted, as indicated.  Additional history: as documented    Labs reviewed in EPIC    Reviewed and updated as needed this visit by clinical staff  Tobacco  Allergies  Meds  Med Hx  Surg Hx  Fam Hx  Soc Hx      Reviewed and updated as needed this visit by Provider         ROS:  CONSTITUTIONAL: NEGATIVE for fever, chills, change in weight  RESP: NEGATIVE for significant cough or SOB  CV: NEGATIVE for chest pain, palpitations or peripheral edema    OBJECTIVE:     /68  Pulse 93  Temp 98.1  F (36.7  C) (Oral)  Ht 5' 5\" (1.651 m)  Wt 151 lb (68.5 kg)  SpO2 99%  BMI 25.13 kg/m2  Body mass index is 25.13 kg/(m^2).  GENERAL: healthy, alert and no distress  RESP: lungs clear to auscultation - no rales, rhonchi or wheezes  CV: regular rate and rhythm, normal S1 S2, no S3 or S4, no murmur, click or rub    ASSESSMENT/PLAN:     (I24.9) ACS (acute coronary syndrome) (H)  (primary encounter diagnosis)  Comment: stable; family history and recent virus  Plan: atorvastatin (LIPITOR) 10 MG tablet        -will need cholesterol repeated in 2-3 months  -pt to follow up with cardiology this week    (Z98.61) Status post percutaneous transluminal coronary angioplasty  Comment:   Plan: metoprolol succinate (TOPROL-XL) 25 MG " 24 hr         tablet, ticagrelor (BRILINTA) 90 MG tablet        -pt would like 3 month refill today  -told patient that his cardiac medications like possible increase in metoprolol with be adjusted by cardiology (sees them in a couple of days)  -counseled on staying on brilinta for one year and cardiology to give okay to discontinue  -pt works in IT, 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  Hemoglobin A1c of 5.9%.  Counseled on diet and exercise.    Patient to follow up in 6 months for a routine physical.      Izabela Keller MD  [facility]

## 2018-10-24 LAB
BACTERIA SPEC CULT: NO GROWTH
BACTERIA SPEC CULT: NO GROWTH
Lab: NORMAL
Lab: NORMAL
SPECIMEN SOURCE: NORMAL
SPECIMEN SOURCE: NORMAL

## 2018-10-25 PROBLEM — I25.10 CORONARY ARTERY DISEASE INVOLVING NATIVE CORONARY ARTERY OF NATIVE HEART: Status: ACTIVE | Noted: 2018-10-25

## 2018-10-25 PROBLEM — I50.22 CHRONIC SYSTOLIC CONGESTIVE HEART FAILURE (H): Status: ACTIVE | Noted: 2018-10-25

## 2018-10-25 PROBLEM — I50.9 CHF (CONGESTIVE HEART FAILURE) (H): Status: ACTIVE | Noted: 2018-10-25

## 2018-10-26 ENCOUNTER — OFFICE VISIT (OUTPATIENT)
Dept: CARDIOLOGY | Facility: CLINIC | Age: 43
End: 2018-10-26
Payer: COMMERCIAL

## 2018-10-26 ENCOUNTER — APPOINTMENT (OUTPATIENT)
Dept: LAB | Facility: CLINIC | Age: 43
End: 2018-10-26

## 2018-10-26 VITALS
BODY MASS INDEX: 24.68 KG/M2 | WEIGHT: 148.1 LBS | HEIGHT: 65 IN | HEART RATE: 68 BPM | DIASTOLIC BLOOD PRESSURE: 64 MMHG | SYSTOLIC BLOOD PRESSURE: 110 MMHG

## 2018-10-26 DIAGNOSIS — I24.9 ACS (ACUTE CORONARY SYNDROME) (H): ICD-10-CM

## 2018-10-26 DIAGNOSIS — Z98.61 STATUS POST PERCUTANEOUS TRANSLUMINAL CORONARY ANGIOPLASTY: ICD-10-CM

## 2018-10-26 DIAGNOSIS — R79.89 ELEVATED LIVER FUNCTION TESTS: ICD-10-CM

## 2018-10-26 DIAGNOSIS — I23.8 PERICARDITIS AS COMPLICATION OF ACUTE MYOCARDIAL INFARCTION (H): ICD-10-CM

## 2018-10-26 DIAGNOSIS — I31.9 PERICARDITIS AS COMPLICATION OF ACUTE MYOCARDIAL INFARCTION (H): ICD-10-CM

## 2018-10-26 DIAGNOSIS — I24.9 ACS (ACUTE CORONARY SYNDROME) (H): Primary | ICD-10-CM

## 2018-10-26 LAB
ALBUMIN SERPL-MCNC: 3.6 G/DL (ref 3.4–5)
ALP SERPL-CCNC: 181 U/L (ref 40–150)
ALT SERPL W P-5'-P-CCNC: 92 U/L (ref 0–70)
ANION GAP SERPL CALCULATED.3IONS-SCNC: 4 MMOL/L (ref 3–14)
AST SERPL W P-5'-P-CCNC: 37 U/L (ref 0–45)
BILIRUB SERPL-MCNC: 0.5 MG/DL (ref 0.2–1.3)
BUN SERPL-MCNC: 18 MG/DL (ref 7–30)
CALCIUM SERPL-MCNC: 8.8 MG/DL (ref 8.5–10.1)
CHLORIDE SERPL-SCNC: 103 MMOL/L (ref 94–109)
CO2 SERPL-SCNC: 28 MMOL/L (ref 20–32)
CREAT SERPL-MCNC: 1.12 MG/DL (ref 0.66–1.25)
GFR SERPL CREATININE-BSD FRML MDRD: 71 ML/MIN/1.7M2
GLUCOSE SERPL-MCNC: 104 MG/DL (ref 70–99)
POTASSIUM SERPL-SCNC: 4.2 MMOL/L (ref 3.4–5.3)
PROT SERPL-MCNC: 8 G/DL (ref 6.8–8.8)
SODIUM SERPL-SCNC: 135 MMOL/L (ref 133–144)

## 2018-10-26 PROCEDURE — 99214 OFFICE O/P EST MOD 30 MIN: CPT | Performed by: PHYSICIAN ASSISTANT

## 2018-10-26 PROCEDURE — 80053 COMPREHEN METABOLIC PANEL: CPT | Performed by: INTERNAL MEDICINE

## 2018-10-26 PROCEDURE — 36415 COLL VENOUS BLD VENIPUNCTURE: CPT | Performed by: INTERNAL MEDICINE

## 2018-10-26 RX ORDER — COLCHICINE 0.6 MG/1
0.6 TABLET ORAL DAILY
Qty: 28 TABLET | Refills: 0
Start: 2018-10-26 | End: 2019-12-12

## 2018-10-26 RX ORDER — CHOLECALCIFEROL (VITAMIN D3) 50 MCG
1 TABLET ORAL EVERY OTHER DAY
COMMUNITY

## 2018-10-26 RX ORDER — NITROGLYCERIN 0.4 MG/1
TABLET SUBLINGUAL
Qty: 25 TABLET | Refills: 1 | Status: SHIPPED | OUTPATIENT
Start: 2018-10-26

## 2018-10-26 NOTE — PATIENT INSTRUCTIONS
Thank you for your [facility] visit today. Your provider has recommended the following:  Medication Changes:  DECREASE the colchicine to 1 tabs a day. You can stop after Nov 3  I sent a prescription for nitroglycerin to the pharmacy  If the Brilinta is not affordable please call and we can change the medication  Recommendations:  Cardiac rehab  Follow-up:  See Dr Sorenson for cardiology follow up in 3 months.    Reminder:  Please bring in your current medication list or your medication, over the counter supplements and vitamin bottles as we will review these at each office visit.

## 2018-10-26 NOTE — MR AVS SNAPSHOT
After Visit Summary   10/26/2018    Neptali Duong    MRN: [id number]           Patient Information     Date Of Birth          1975        Visit Information        Provider Department      10/26/2018 10:00 AM Uma Longo PA-C [facility]        Today's Diagnoses     Status post percutaneous transluminal coronary angioplasty        ACS (acute coronary syndrome) (H)        Pericarditis as complication of acute myocardial infarction (H)          Care Instructions    Thank you for your  Heart Care visit today. Your provider has recommended the following:  Medication Changes:  DECREASE the colchicine to 1 tabs a day. You can stop after Nov 3  I sent a prescription for nitroglycerin to the pharmacy  If the Brilinta is not affordable please call and we can change the medication  Recommendations:  Cardiac rehab  Follow-up:  See Dr Sorenson for cardiology follow up in 3 months.    Reminder:  Please bring in your current medication list or your medication, over the counter supplements and vitamin bottles as we will review these at each office visit.                  Follow-ups after your visit        Additional Services     Follow-Up with Cardiologist                 Future tests that were ordered for you today     Open Future Orders        Priority Expected Expires Ordered    Follow-Up with Cardiologist Routine 1/26/2019 10/25/2020 10/26/2018            Who to contact     If you have questions or need follow up information about today's clinic visit or your schedule please contact [facility] directly at [phone].  Normal or non-critical lab and imaging results will be communicated to you by MyChart, letter or phone within 4 business days after the clinic has received the results. If you do not hear from us within 7 days, please contact the clinic through MyChart or phone. If you have a critical or abnormal lab  "result, we will notify you by phone as soon as possible.  Submit refill requests through Yu[employer] or call your pharmacy and they will forward the refill request to us. Please allow 3 business days for your refill to be completed.          Additional Information About Your Visit        NuView Systemshart Information     Yulex lets you send messages to your doctor, view your test results, renew your prescriptions, schedule appointments and more. To sign up, go to [email or web address] . Click on \"Log in\" on the left side of the screen, which will take you to the Welcome page. Then click on \"Sign up Now\" on the right side of the page.     You will be asked to enter the access code listed below, as well as some personal information. Please follow the directions to create your username and password.     Your access code is: [email or web address]  Expires: 2019  2:38 PM     Your access code will  in 90 days. If you need help or a new code, please call your [place] clinic or [phone].        Care EveryWhere ID     This is your Care EveryWhere ID. This could be used by other organizations to access your [place] medical records  [id number]        Your Vitals Were     Pulse Height BMI (Body Mass Index)             68 1.651 m (5' 5\") 24.65 kg/m2          Blood Pressure from Last 3 Encounters:   10/26/18 110/64   10/23/18 120/68   10/20/18 120/78    Weight from Last 3 Encounters:   10/26/18 67.2 kg (148 lb 1.6 oz)   10/23/18 68.5 kg (151 lb)   10/20/18 69.4 kg (153 lb 1.6 oz)              We Performed the Following     Follow-Up with Cardiac Advanced Practice Provider          Today's Medication Changes          These changes are accurate as of 10/26/18 10:58 AM.  If you have any questions, ask your nurse or doctor.               Start taking these medicines.        Dose/Directions    nitroGLYcerin 0.4 MG sublingual tablet   Commonly known as:  NITROSTAT   Used for:  Status post percutaneous transluminal coronary angioplasty "   Started by:  Uma Longo PA-C        For chest pain place 1 tablet under the tongue every 5 minutes for 3 doses. If symptoms persist 5 minutes after 1st dose call 911.   Quantity:  25 tablet   Refills:  1         These medicines have changed or have updated prescriptions.        Dose/Directions    colchicine 0.6 MG tablet   Commonly known as:  COLCYRS   This may have changed:  when to take this   Used for:  Pericarditis as complication of acute myocardial infarction (H)   Changed by:  Uma Longo PA-C        Dose:  0.6 mg   Take 1 tablet (0.6 mg) by mouth daily   Quantity:  28 tablet   Refills:  0            Where to get your medicines      [place] [facility] [address] [facility]  [address] [facility] [address]     Phone:  [phone]     nitroGLYcerin 0.4 MG sublingual tablet    ticagrelor 90 MG tablet         Some of these will need a paper prescription and others can be bought over the counter.  Ask your nurse if you have questions.     You don't need a prescription for these medications     colchicine 0.6 MG tablet                Primary Care Provider Fax #    Physician No Ref-Primary [phone]       No address on file        Equal Access to Services     ASIM BUSCH : Hadii earnest greeneo Sodanna, waaxda luqadaha, qaybta kaalmada adeegyada, abraham reynoso. So [facility] [phone].    ATENCIÓN: Si habla español, tiene a boles disposición servicios gratuitos de asistencia lingüística. Maria Teresa al [phone].    We comply with applicable federal civil rights laws and [place] laws. We do not discriminate on the basis of race, color, national origin, age, disability, sex, sexual orientation, or gender identity.            Thank you!     Thank you for choosing [facility]  for your care. Our goal is always to provide you with excellent care. Hearing  back from our patients is one way we can continue to improve our services. Please take a few minutes to complete the written survey that you may receive in the mail after your visit with us. Thank you!             Your Updated Medication List - Protect others around you: Learn how to safely use, store and throw away your medicines at www.disposemymeds.org.          This list is accurate as of 10/26/18 10:58 AM.  Always use your most recent med list.                   Brand Name Dispense Instructions for use Diagnosis    acetaminophen 500 MG Caps      Take by mouth as needed        aspirin 81 MG tablet      Take 81 mg by mouth daily        atorvastatin 10 MG tablet    LIPITOR    90 tablet    Take 2 tablets (20 mg) by mouth every evening    ACS (acute coronary syndrome) (H)       colchicine 0.6 MG tablet    COLCYRS    28 tablet    Take 1 tablet (0.6 mg) by mouth daily    Pericarditis as complication of acute myocardial infarction (H)       magnesium 500 MG Tabs      Take by mouth daily        metoprolol succinate 25 MG 24 hr tablet    TOPROL-XL    180 tablet    Take 1 tablet (25 mg) by mouth 2 times daily    Status post percutaneous transluminal coronary angioplasty       nitroGLYcerin 0.4 MG sublingual tablet    NITROSTAT    25 tablet    For chest pain place 1 tablet under the tongue every 5 minutes for 3 doses. If symptoms persist 5 minutes after 1st dose call 911.    Status post percutaneous transluminal coronary angioplasty       ticagrelor 90 MG tablet    BRILINTA    180 tablet    Take 1 tablet (90 mg) by mouth 2 times daily    Status post percutaneous transluminal coronary angioplasty       vitamin D 2000 units tablet      Take 1 tablet by mouth every other day

## 2018-10-26 NOTE — LETTER
[address]  [facility] [address]  Phone: [phone]  Fax: [phone]    October 26, 2018        Neptali Duong  [address] [facility] [address]          To whom it may concern:    RE: Neptali Duong    Patient was seen and treated today at our clinic.  Patient may return to work 11/5/2018 with the following:  No restrictions    Please contact me for questions or concerns.      Sincerely,        Uma Longo PA-C

## 2018-10-26 NOTE — LETTER
10/26/2018    Physician No Ref-Primary  No address on file    RE: Neptali Duong       Dear Colleague,    I had the pleasure of seeing Neptali Duong in the [facility].    Please see separate dictation for HPI, PHYSICAL EXAM AND IMPRESSION/PLAN.    CURRENT MEDICATIONS:  Current Outpatient Prescriptions   Medication Sig Dispense Refill     acetaminophen 500 MG CAPS Take by mouth as needed       aspirin 81 MG tablet Take 81 mg by mouth daily       atorvastatin (LIPITOR) 10 MG tablet Take 2 tablets (20 mg) by mouth every evening 90 tablet 0     Cholecalciferol (VITAMIN D) 2000 units tablet Take 1 tablet by mouth every other day       colchicine (COLCYRS) 0.6 MG tablet Take 1 tablet (0.6 mg) by mouth 2 times daily for 14 days 28 tablet 0     magnesium 500 MG TABS Take by mouth daily       metoprolol succinate (TOPROL-XL) 25 MG 24 hr tablet Take 1 tablet (25 mg) by mouth 2 times daily 180 tablet 0     ticagrelor (BRILINTA) 90 MG tablet Take 1 tablet (90 mg) by mouth 2 times daily 180 tablet 0       ALLERGIES:   No Known Allergies    PAST MEDICAL HISTORY:  Past Medical History:   Diagnosis Date     ACS (acute coronary syndrome) (H)      CHF (congestive heart failure) (H)      Glucose intolerance      Hyperlipidemia      Myocardial infarct (H)        PAST SURGICAL HISTORY:  Past Surgical History:   Procedure Laterality Date     NO HISTORY OF SURGERY         SOCIAL HISTORY:  Social History     Social History     Marital status:      Spouse name: N/A     Number of children: N/A     Years of education: N/A     Social History Main Topics     Smoking status: Never Smoker     Smokeless tobacco: Never Used     Alcohol use No     Drug use: No     Sexual activity: Yes     Partners: Female     Other Topics Concern     None     Social History Narrative       FAMILY HISTORY:  Family History   Problem Relation Age of Onset     Coronary Artery Disease Father      Myocardial Infarction Brother        Review of  Systems:  Skin:  Negative       Eyes:  Positive for   reading glasses  ENT:  Negative      Respiratory:  Positive for dyspnea on exertion     Cardiovascular:    Positive for;fatigue    Gastroenterology: Negative      Genitourinary:  Negative      Musculoskeletal:  Negative      Neurologic:  Positive for headaches    Psychiatric:  Negative      Heme/Lymph/Imm:  Negative      Endocrine:  Positive for   prediabetic     Reviewed. Remainder of the note dictated.    Uma Longo PA-C        Service Date: 10/26/2018      HISTORY OF PRESENT ILLNESS:  Mr. Duong is a pleasant 43-year-old gentleman who presents to the office today for followup after a recent hospitalization at [facility] for an acute myocardial infarction on 10/17 to 10/20.      Neptali had presented with a several day history of intermittent chest discomfort as well as dyspnea.  He also had some achiness in his left shoulder.  However, his whole presentation was also complicated by intermittent fevers and generalized aches as well.  Upon presentation, his troponin was significantly elevated at 97.  It was noted he had cardiac risk factors of dyslipidemia and a strong family history of coronary artery disease with a brother having a myocardial infarction from which he passed away in his mid-30s and his father having ischemic symptoms prior to the age of 50.  Initially, he was started on appropriate medical therapy.  He underwent an echocardiogram which showed an EF of around 45%-50% with a mild inferolateral wall motion abnormality.  Ultimately, he did undergo coronary angiography and was found to have a 90% thrombotic lesion in the mid circumflex.  He also had a 70% stenosis in the OM3.  He underwent drug-eluting stent placement to each of these lesions.  He was also found to have mild disease in the LAD, moderate disease in the distal circumflex and an OM4 and mild disease in the RCA.  His LVEDP was 20 mmHg.  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  The right external iliac artery and common femoral arteries showed no evidence of significant peripheral arterial disease.        The patient tells me that he did have some shortness of breath following his procedure, he was treated with IV diuretic therapy and this did improve his symptoms.  Again, he had been started on appropriate medical therapy including high-potency statin and dual antiplatelet therapy.  During his hospitalization, he did continue to have intermittent fevers.  However, his workup did not show any specific etiology.  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  He was started on colchicine at that time.  He presents today for followup.        He overall states he is feeling okay.  He tells me that his fevers just finally started breaking earlier this week.  Prior to that, he was still feeling quite achy and having headaches.  He does have some very mild shortness of breath with exertion, improved from what he was experiencing in the hospital.  He has not had any recurrent angina which was a heartburn sensation.  He has not had any recurrent symptoms consistent with his pericarditis.  He does question if he can discontinue the colchicine.  He has been having some GI upset and diarrhea.  He did participate in the intake session of Cardiac Rehab on the 23rd, but again at that time was still having body aches and headaches.  He is planning on enrolling in routine rehab, but wanted to improve from the fevers before starting.      CURRENT CARDIAC MEDICATIONS:   1.  Aspirin 81 mg daily.   2.  Atorvastatin 20 mg daily.   3.  Colchicine 0.6 mg b.i.d.   4.  Toprol-XL 25 mg b.i.d.   5.  Brilinta 90 mg b.i.d.      The remainder of his medications, allergies and review of systems were reviewed and as are documented separately.      PHYSICAL EXAMINATION:   GENERAL:  The patient is a  rica 43-year-old gentleman who appears his stated age.  He is in no apparent distress.   VITAL SIGNS:  His blood pressure is 110/64, pulse 68, weight is 148 pounds.  Breathing is nonlabored.   LUNGS:  Clear to auscultation.   CARDIAC:  Examination  reveals irregular rate and rhythm.  I do not appreciate any murmur or rub.   EXTREMITIES:  Lower extremities show no evidence of edema.   NEUROLOGIC:  Alert, oriented.      He had a CMP today.  Sodium is 135, potassium 4.2, BUN is 18, creatinine is 1.1.  His ALT today is 92.  His alkaline phosphatase is 181.      ASSESSMENT/PLAN:  Mr. Duong is a pleasant 43-year-old gentleman with a history of dyslipidemia and a strong family history of premature coronary artery disease who recently presented with anginal symptoms which actually had resolved about 24 hours prior to presentation along with some heart failure symptoms and intermittent fevers.  Again, he was noted to have significantly elevated troponin of 97 at the time of presentation.  He did end up undergoing coronary angiography which showed a 90% thrombotic lesion in the mid circumflex.  He also had a significant lesion in the OM3.  He had overlapping drug-eluting stent placement x2 to these lesions.  He was noted to have mild to moderate disease throughout the remainder of the coronary tree.  Echocardiography showed mildly reduced LV systolic function with an EF of around 45%-50% with a mild inferolateral wall motion abnormality.        He did have some chest discomfort following his procedure consistent with post-MI pericarditis was started on colchicine.  Unfortunately, he has developed some GI upset from the colchicine.  For the time being, I asked him to decrease to 1 tablet a day until he completes his 2-week course which would be 11/03.  He was agreeable to this.  He is on dual antiplatelet therapy and tolerating this without difficulty.  We did discuss that if the Brilinta is not affordable, he should call  and we can consider changing over to clopidogrel.  Additionally, it was noted he did not have a prescription for sublingual nitro.  This was provided today.  He will be participating in Cardiac Rehab.        Incidentally, during his coronary angiogram, he was found to have significant disease in the right internal iliac artery.  He does not seem to be having any significant claudication symptoms at this time, but we may be able to tell better after he participates in Cardiac Rehab.  I do not see any further recommendations regarding this in the hospital notes, but I will check with Dr. Sorenson to see if he would like any further testing for this.      The patient did state he is having some mild dyspnea on exertion but that this was better than he was experiencing in the hospital.  Initially I considered putting him on a low dose of furosemide, at least temporarily; however, he declined at this time.  Another possibility is that he may feel a bit dyspneic with the Brilinta.  For the time being, we decided to keep an eye on his symptoms and if they do not continue to improve and then we can make other changes.      RTW letter provided.  I recommended following up in the Cardiology office in about 3 months with Dr. Sorenson.  Of course, I encouraged him to contact us sooner with questions or concerns.         EDWARDO GATES PA-C       ADDENDUM: D/W Dr Sorenson, no further evaluation of the right internal iliac is necessary unless he is having symptoms. He did recommend a repeat echo in about a month. I also am going to ask him to have repeat LFTs at that time as they are still a bit abnormal, although in a different pattern than noted previously. Edwardo Gates PA-C         D: 10/28/2018   T: 10/28/2018   MT: NELLY      Name:     JESSICA ST   MRN:      [id number]        Account:      [id number]   :      1975           Service Date: 10/26/2018      Document: [id number]        Thank you for allowing  me to participate in the care of your patient.      Sincerely,     Uma Longo PA-C     [facility]    cc:   HECTOR Plata  [facility]  [address] [place] [id number]

## 2018-10-28 NOTE — PROGRESS NOTES
Service Date: 10/26/2018      HISTORY OF PRESENT ILLNESS:  Mr. Duong is a pleasant 43-year-old gentleman who presents to the office today for followup after a recent hospitalization at [facility] for an acute myocardial infarction on 10/17 to 10/20.      Neptali had presented with a several day history of intermittent chest discomfort as well as dyspnea.  He also had some achiness in his left shoulder.  However, his whole presentation was also complicated by intermittent fevers and generalized aches as well.  Upon presentation, his troponin was significantly elevated at 97.  It was noted he had cardiac risk factors of dyslipidemia and a strong family history of coronary artery disease with a brother having a myocardial infarction from which he passed away in his mid-30s and his father having ischemic symptoms prior to the age of 50.  Initially, he was started on appropriate medical therapy.  He underwent an echocardiogram which showed an EF of around 45%-50% with a mild inferolateral wall motion abnormality.  Ultimately, he did undergo coronary angiography and was found to have a 90% thrombotic lesion in the mid circumflex.  He also had a 70% stenosis in the OM3.  He underwent drug-eluting stent placement to each of these lesions.  He was also found to have mild disease in the LAD, moderate disease in the distal circumflex and an OM4 and mild disease in the RCA.  His LVEDP was 20 mmHg.  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  The right external iliac artery and common femoral arteries showed no evidence of significant peripheral arterial disease.        The patient tells me that he did have some shortness of breath following his procedure, he was treated with IV diuretic therapy and this did improve his symptoms.  Again, he had been started on appropriate medical therapy including high-potency statin and dual antiplatelet therapy.  During his hospitalization, he did continue  to have intermittent fevers.  However, his workup did not show any specific etiology.  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  He was started on colchicine at that time.  He presents today for followup.        He overall states he is feeling okay.  He tells me that his fevers just finally started breaking earlier this week.  Prior to that, he was still feeling quite achy and having headaches.  He does have some very mild shortness of breath with exertion, improved from what he was experiencing in the hospital.  He has not had any recurrent angina which was a heartburn sensation.  He has not had any recurrent symptoms consistent with his pericarditis.  He does question if he can discontinue the colchicine.  He has been having some GI upset and diarrhea.  He did participate in the intake session of Cardiac Rehab on the 23rd, but again at that time was still having body aches and headaches.  He is planning on enrolling in routine rehab, but wanted to improve from the fevers before starting.      CURRENT CARDIAC MEDICATIONS:   1.  Aspirin 81 mg daily.   2.  Atorvastatin 20 mg daily.   3.  Colchicine 0.6 mg b.i.d.   4.  Toprol-XL 25 mg b.i.d.   5.  Brilinta 90 mg b.i.d.      The remainder of his medications, allergies and review of systems were reviewed and as are documented separately.      PHYSICAL EXAMINATION:   GENERAL:  The patient is a pleasant 43-year-old gentleman who appears his stated age.  He is in no apparent distress.   VITAL SIGNS:  His blood pressure is 110/64, pulse 68, weight is 148 pounds.  Breathing is nonlabored.   LUNGS:  Clear to auscultation.   CARDIAC:  Examination  reveals irregular rate and rhythm.  I do not appreciate any murmur or rub.   EXTREMITIES:  Lower extremities show no evidence of edema.   NEUROLOGIC:  Alert, oriented.      He had a CMP today.  Sodium is 135, potassium 4.2, BUN is 18, creatinine is  1.1.  His ALT today is 92.  His alkaline phosphatase is 181.      ASSESSMENT/PLAN:  Mr. Duong is a pleasant 43-year-old gentleman with a history of dyslipidemia and a strong family history of premature coronary artery disease who recently presented with anginal symptoms which actually had resolved about 24 hours prior to presentation along with some heart failure symptoms and intermittent fevers.  Again, he was noted to have significantly elevated troponin of 97 at the time of presentation.  He did end up undergoing coronary angiography which showed a 90% thrombotic lesion in the mid circumflex.  He also had a significant lesion in the OM3.  He had overlapping drug-eluting stent placement x2 to these lesions.  He was noted to have mild to moderate disease throughout the remainder of the coronary tree.  Echocardiography showed mildly reduced LV systolic function with an EF of around 45%-50% with a mild inferolateral wall motion abnormality.        He did have some chest discomfort following his procedure consistent with post-MI pericarditis was started on colchicine.  Unfortunately, he has developed some GI upset from the colchicine.  For the time being, I asked him to decrease to 1 tablet a day until he completes his 2-week course which would be 11/03.  He was agreeable to this.  He is on dual antiplatelet therapy and tolerating this without difficulty.  We did discuss that if the Brilinta is not affordable, he should call and we can consider changing over to clopidogrel.  Additionally, it was noted he did not have a prescription for sublingual nitro.  This was provided today.  He will be participating in Cardiac Rehab.        Incidentally, during his coronary angiogram, he was found to have significant disease in the right internal iliac artery.  He does not seem to be having any significant claudication symptoms at this time, but we may be able to tell better after he participates in Cardiac Rehab.  I do not see any  further recommendations regarding this in the hospital notes, but I will check with Dr. Sorenson to see if he would like any further testing for this.      The patient did state he is having some mild dyspnea on exertion but that this was better than he was experiencing in the hospital.  Initially I considered putting him on a low dose of furosemide, at least temporarily; however, he declined at this time.  Another possibility is that he may feel a bit dyspneic with the Brilinta.  For the time being, we decided to keep an eye on his symptoms and if they do not continue to improve and then we can make other changes.      RTW letter provided.  I recommended following up in the Cardiology office in about 3 months with Dr. Sorenson.  Of course, I encouraged him to contact us sooner with questions or concerns.         EDWARDO GATES PA-C       ADDENDUM: D/W Dr Sorenson, no further evaluation of the right internal iliac is necessary unless he is having symptoms. He did recommend a repeat echo in about a month. I also am going to ask him to have repeat LFTs at that time as they are still a bit abnormal, although in a different pattern than noted previously. Edwardo Gates PA-C         D: 10/28/2018   T: 10/28/2018   MT: NELLY      Name:     JESSICA ST   MRN:      [id number]        Account:      [id number]   :      1975           Service Date: 10/26/2018      Document: [id number]

## 2018-10-30 ENCOUNTER — HOSPITAL ENCOUNTER (OUTPATIENT)
Dept: CARDIAC REHAB | Facility: CLINIC | Age: 43
End: 2018-10-30
Attending: STUDENT IN AN ORGANIZED HEALTH CARE EDUCATION/TRAINING PROGRAM
Payer: COMMERCIAL

## 2018-10-30 PROCEDURE — 93798 PHYS/QHP OP CAR RHAB W/ECG: CPT | Performed by: REHABILITATION PRACTITIONER

## 2018-10-30 PROCEDURE — 40000116 ZZH STATISTIC OP CR VISIT: Performed by: REHABILITATION PRACTITIONER

## 2018-10-31 ENCOUNTER — TELEPHONE (OUTPATIENT)
Dept: INTERNAL MEDICINE | Facility: CLINIC | Age: 43
End: 2018-10-31

## 2018-10-31 DIAGNOSIS — I24.9 ACS (ACUTE CORONARY SYNDROME) (H): ICD-10-CM

## 2018-10-31 RX ORDER — ATORVASTATIN CALCIUM 10 MG/1
20 TABLET, FILM COATED ORAL EVERY EVENING
Qty: 180 TABLET | Refills: 0 | Status: SHIPPED | OUTPATIENT
Start: 2018-10-31 | End: 2019-07-19

## 2018-10-31 NOTE — TELEPHONE ENCOUNTER
Patient calls stating provider only sent over 45 days worth of pills for atorvastatin versus 3 month supply. Updated order from 90 tabs to 180 tabs to dispense.

## 2018-11-01 ENCOUNTER — HOSPITAL ENCOUNTER (OUTPATIENT)
Dept: CARDIAC REHAB | Facility: CLINIC | Age: 43
End: 2018-11-01
Attending: STUDENT IN AN ORGANIZED HEALTH CARE EDUCATION/TRAINING PROGRAM
Payer: COMMERCIAL

## 2018-11-01 ENCOUNTER — TELEPHONE (OUTPATIENT)
Dept: CARDIOLOGY | Facility: CLINIC | Age: 43
End: 2018-11-01

## 2018-11-01 DIAGNOSIS — R06.09 DOE (DYSPNEA ON EXERTION): Primary | ICD-10-CM

## 2018-11-01 PROCEDURE — 93798 PHYS/QHP OP CAR RHAB W/ECG: CPT | Performed by: OCCUPATIONAL THERAPIST

## 2018-11-01 PROCEDURE — 40000116 ZZH STATISTIC OP CR VISIT: Performed by: OCCUPATIONAL THERAPIST

## 2018-11-01 NOTE — TELEPHONE ENCOUNTER
Called patient with recommendations to have a repeat Echo per Uma YOUNG. See last OV 10/28/18. Pt agrees to plan. Orders entered. ANNE Burkett

## 2018-11-05 DIAGNOSIS — Z98.61 STATUS POST PERCUTANEOUS TRANSLUMINAL CORONARY ANGIOPLASTY: ICD-10-CM

## 2018-11-05 NOTE — TELEPHONE ENCOUNTER
"Patient calling to request Brilinta rx be sent to [facility].  States it had been sent to local pharmacy but he prefers mail order.     Requested Prescriptions   Pending Prescriptions Disp Refills     ticagrelor (BRILINTA) 90 MG tablet 180 tablet 3     Sig: Take 1 tablet (90 mg) by mouth 2 times daily    Platelet Inhibitors Failed    11/5/2018 10:53 AM       Failed - Normal ALT on file in past 12 months    Recent Labs   Lab Test  10/26/18   0913   ALT  92*            Failed - Normal HGB on file in past 12 months    Recent Labs   Lab Test  10/20/18   0500   HGB  11.1*              Passed - Normal AST on file in past 12 months    Recent Labs   Lab Test  10/26/18   0913   AST  37            Passed - Normal Platelets on file in past 12 months    Recent Labs   Lab Test  10/20/18   0500   PLT  192              Passed - Recent (12 mo) or future (30 days) visit within the authorizing provider's specialty    Patient had office visit in the last 12 months or has a visit in the next 30 days with authorizing provider or within the authorizing provider's specialty.  See \"Patient Info\" tab in inbasket, or \"Choose Columns\" in Meds & Orders section of the refill encounter.             Passed - Patient is age 18 or older       Passed - Normal serum creatinine on file in past 12 months    Recent Labs   Lab Test  10/26/18   0913   CR  1.12                    "

## 2018-11-06 NOTE — TELEPHONE ENCOUNTER
Called patient to discuss [facility]. Pt has 10 days left of Brilinta at home. While discussing the mail order, pt decided he wanted to stay with the current pharmacy as there are refills ready for pickup. RN number given to pt incase he decides to switch to mail delivery in future. ANNE Burkett

## 2018-11-08 ENCOUNTER — HOSPITAL ENCOUNTER (OUTPATIENT)
Dept: CARDIAC REHAB | Facility: CLINIC | Age: 43
End: 2018-11-08
Attending: STUDENT IN AN ORGANIZED HEALTH CARE EDUCATION/TRAINING PROGRAM
Payer: COMMERCIAL

## 2018-11-08 PROCEDURE — 40000116 ZZH STATISTIC OP CR VISIT

## 2018-11-08 PROCEDURE — 93798 PHYS/QHP OP CAR RHAB W/ECG: CPT

## 2018-11-14 ENCOUNTER — HOSPITAL ENCOUNTER (OUTPATIENT)
Dept: CARDIAC REHAB | Facility: CLINIC | Age: 43
End: 2018-11-14
Attending: STUDENT IN AN ORGANIZED HEALTH CARE EDUCATION/TRAINING PROGRAM
Payer: COMMERCIAL

## 2018-11-14 PROCEDURE — 93798 PHYS/QHP OP CAR RHAB W/ECG: CPT | Performed by: OCCUPATIONAL THERAPIST

## 2018-11-14 PROCEDURE — 40000116 ZZH STATISTIC OP CR VISIT: Performed by: OCCUPATIONAL THERAPIST

## 2018-11-15 ENCOUNTER — TELEPHONE (OUTPATIENT)
Dept: CARDIOLOGY | Facility: CLINIC | Age: 43
End: 2018-11-15

## 2018-11-15 NOTE — TELEPHONE ENCOUNTER
Spoke with patient regarding letter. We will write another letter for him. Pt will call back to let me know if there is a fax number or if he wants to pick it up in clinic. ANNE Burkett

## 2018-11-15 NOTE — TELEPHONE ENCOUNTER
Received VM from pt stating he needs a letter to return to work. Pt stated he received a letter but it did not include the dates he was hospitalized and his employer rejected the letter. Pt was seen for post-hospital follow up by Uma Longo on 10/26/18 and she provided a return to work letter at that visit. Called and spoke with ANNE Reilly and advised pt would like another letter that includes the dates of his hospitalization. Called back to pt, no answer. Left VM stating that a new letter was requested from Uma and her nurse. Left phone numbers for [facility] nursing and Team 1.

## 2018-11-21 ENCOUNTER — HOSPITAL ENCOUNTER (OUTPATIENT)
Dept: CARDIAC REHAB | Facility: CLINIC | Age: 43
End: 2018-11-21
Attending: STUDENT IN AN ORGANIZED HEALTH CARE EDUCATION/TRAINING PROGRAM
Payer: COMMERCIAL

## 2018-11-21 PROCEDURE — 93798 PHYS/QHP OP CAR RHAB W/ECG: CPT

## 2018-11-21 PROCEDURE — 40000116 ZZH STATISTIC OP CR VISIT

## 2018-11-28 ENCOUNTER — HOSPITAL ENCOUNTER (OUTPATIENT)
Dept: CARDIAC REHAB | Facility: CLINIC | Age: 43
End: 2018-11-28
Attending: STUDENT IN AN ORGANIZED HEALTH CARE EDUCATION/TRAINING PROGRAM
Payer: COMMERCIAL

## 2018-11-28 PROCEDURE — 40000116 ZZH STATISTIC OP CR VISIT: Performed by: OCCUPATIONAL THERAPIST

## 2018-11-28 PROCEDURE — 93798 PHYS/QHP OP CAR RHAB W/ECG: CPT | Performed by: OCCUPATIONAL THERAPIST

## 2018-11-29 ENCOUNTER — TELEPHONE (OUTPATIENT)
Dept: CARDIOLOGY | Facility: CLINIC | Age: 43
End: 2018-11-29

## 2018-11-29 ENCOUNTER — HOSPITAL ENCOUNTER (OUTPATIENT)
Dept: CARDIAC REHAB | Facility: CLINIC | Age: 43
End: 2018-11-29
Attending: STUDENT IN AN ORGANIZED HEALTH CARE EDUCATION/TRAINING PROGRAM
Payer: COMMERCIAL

## 2018-11-29 PROCEDURE — 40000116 ZZH STATISTIC OP CR VISIT: Performed by: OCCUPATIONAL THERAPIST

## 2018-11-29 PROCEDURE — 93798 PHYS/QHP OP CAR RHAB W/ECG: CPT | Performed by: OCCUPATIONAL THERAPIST

## 2018-11-30 ENCOUNTER — HOSPITAL ENCOUNTER (OUTPATIENT)
Dept: CARDIOLOGY | Facility: CLINIC | Age: 43
Discharge: HOME | End: 2018-11-30
Attending: PHYSICIAN ASSISTANT | Admitting: PHYSICIAN ASSISTANT
Payer: COMMERCIAL

## 2018-11-30 DIAGNOSIS — R06.09 DOE (DYSPNEA ON EXERTION): ICD-10-CM

## 2018-11-30 PROCEDURE — 93306 TTE W/DOPPLER COMPLETE: CPT | Mod: 26 | Performed by: INTERNAL MEDICINE

## 2018-11-30 PROCEDURE — 93306 TTE W/DOPPLER COMPLETE: CPT

## 2018-11-30 NOTE — TELEPHONE ENCOUNTER
Prior Authorization Approval    Authorization Effective Date: 10/31/2018  Authorization Expiration Date: 11/30/2019  Medication: colchicine (COLCYRS) 0.6 MG tablet- APPROVED  Approved Dose/Quantity:   Reference #: [id number]   Insurance Company: [employer] [phone] Fax [phone]  Expected CoPay:       CoPay Card Available:      Foundation Assistance Needed:    Which Pharmacy is filling the prescription (Not needed for infusion/clinic administered): [facility] [address] [facility]  Pharmacy Notified: Yes  Patient Notified: Yes      Central Prior Authorization Team   Phone: [phone]      PA Initiation- VIA PHONE    Medication: colchicine (COLCYRS) 0.6 MG tablet-   Insurance Company: [place] [address] Phone [phone] Fax [phone]  Pharmacy Filling the Rx: [facility] [address] [facility]  Filling Pharmacy Phone: [phone]  Filling Pharmacy Fax:    Start Date: 11/30/2018

## 2018-12-03 ENCOUNTER — HOSPITAL ENCOUNTER (OUTPATIENT)
Dept: CARDIAC REHAB | Facility: CLINIC | Age: 43
End: 2018-12-03
Attending: STUDENT IN AN ORGANIZED HEALTH CARE EDUCATION/TRAINING PROGRAM
Payer: COMMERCIAL

## 2018-12-03 PROCEDURE — 93798 PHYS/QHP OP CAR RHAB W/ECG: CPT

## 2018-12-03 PROCEDURE — 40000116 ZZH STATISTIC OP CR VISIT

## 2018-12-05 ENCOUNTER — TELEPHONE (OUTPATIENT)
Dept: CARDIAC REHAB | Facility: CLINIC | Age: 43
End: 2018-12-05

## 2018-12-05 ENCOUNTER — HOSPITAL ENCOUNTER (OUTPATIENT)
Dept: CARDIAC REHAB | Facility: CLINIC | Age: 43
End: 2018-12-05
Attending: STUDENT IN AN ORGANIZED HEALTH CARE EDUCATION/TRAINING PROGRAM
Payer: COMMERCIAL

## 2018-12-05 ENCOUNTER — TELEPHONE (OUTPATIENT)
Dept: CARDIOLOGY | Facility: CLINIC | Age: 43
End: 2018-12-05

## 2018-12-05 DIAGNOSIS — I25.10 CORONARY ARTERY DISEASE INVOLVING NATIVE CORONARY ARTERY OF NATIVE HEART: ICD-10-CM

## 2018-12-05 DIAGNOSIS — I50.22 CHRONIC SYSTOLIC CONGESTIVE HEART FAILURE (H): Primary | ICD-10-CM

## 2018-12-05 PROCEDURE — 93798 PHYS/QHP OP CAR RHAB W/ECG: CPT | Performed by: REHABILITATION PRACTITIONER

## 2018-12-05 PROCEDURE — 40000116 ZZH STATISTIC OP CR VISIT: Performed by: REHABILITATION PRACTITIONER

## 2018-12-05 PROCEDURE — 40000575 ZZH STATISTIC OP CARDIAC VISIT #2: Performed by: REHABILITATION PRACTITIONER

## 2018-12-05 PROCEDURE — 93797 PHYS/QHP OP CAR RHAB WO ECG: CPT | Performed by: REHABILITATION PRACTITIONER

## 2018-12-05 NOTE — TELEPHONE ENCOUNTER
Pt called in he had been at rehab and had low BP. Informed Pt did not receive any note from rehab. Did find not e in epic. Informed Pt he is not a DR Johnson Pt , but I believe DR Sorenson who did consult in hospital. Informed Pt would send message to BRANDON Longo who is only provider who has seen Pt. Pt in the mean time concerned about low BP. Pt says he has BP cuff at home. Told him if systolic BP less than 100 he can hold his metoprolol, and speak with NP tomorrow. ROSA Trevino RN

## 2018-12-05 NOTE — TELEPHONE ENCOUNTER
Called patient and reviewed Echo results. Heart function improved. Prior wall motional abnormality no longer noted.     Repeat AST/ALT labs ordered as previous lab was elevated. Orders placed. Pt to follow up as planned in 1/2019. ANNE Burkett

## 2018-12-05 NOTE — TELEPHONE ENCOUNTER
"Dr. Johnson,   Your patient arrived for his Discharge appointment in cardiac rehab.  His resting BP was 82/54.  He took his Metoprolol at 8 am this morning.  BP was taken at 10:00 am.  He reports drinking 2 glasses of water prior to his BP.  I did have him drink more and since he denied symptoms at that time he did complete the 6 min walk.  BP increase 120/64 and post BP was 94/60.  Typically the patients resting is /56-60.  Post six minute walk patient reported that he did feel slightly \"light\"  Pre session.  Despite me asking him if he was lightheaded, dizzy or have any symptoms after checking his BP initially.      Thank you,   Karen Arvizu     "

## 2018-12-06 ENCOUNTER — TELEPHONE (OUTPATIENT)
Dept: CARDIOLOGY | Facility: CLINIC | Age: 43
End: 2018-12-06

## 2018-12-06 DIAGNOSIS — Z98.61 STATUS POST PERCUTANEOUS TRANSLUMINAL CORONARY ANGIOPLASTY: ICD-10-CM

## 2018-12-06 RX ORDER — METOPROLOL SUCCINATE 25 MG/1
25 TABLET, EXTENDED RELEASE ORAL DAILY
Refills: 0 | COMMUNITY
Start: 2018-12-06 | End: 2019-01-25

## 2018-12-06 NOTE — TELEPHONE ENCOUNTER
Pt had lower BPs w/ slight LH at cardiac rehab yesterday (12/5/18). Per Uma Longo PA-C, pt should decrease Toprol XL from 25 mg BID to 25 mg daily. Updated med list.   Called pt, reviewed Uma's recommendations. Pt bought BP cuff yesterday, will check it at home. Reviewed to take BP at least 2 hrs after taking Toprol XL. If lower BPs - drink some water. If no resolution of sxs/lower BPs - call RN line. If pt wants to check his home cuff, can always have RN visit to verify accuracy and check manual BP. Pt asked if BB could cause erectile dysfunction; advised it is a possible side effect, however try the lower dose and if this is still a problem for him, call us back and we can discuss alternative med management. Should also discuss this w/ Dr. Sorenson when he sees him (on the wait list currently). Pt agreed w/ plan.   Kiley RODRÍGUEZ

## 2018-12-07 DIAGNOSIS — I25.10 CORONARY ARTERY DISEASE INVOLVING NATIVE CORONARY ARTERY OF NATIVE HEART: ICD-10-CM

## 2018-12-07 DIAGNOSIS — I50.22 CHRONIC SYSTOLIC CONGESTIVE HEART FAILURE (H): ICD-10-CM

## 2018-12-07 LAB
ALT SERPL W P-5'-P-CCNC: 28 U/L (ref 0–70)
AST SERPL W P-5'-P-CCNC: 18 U/L (ref 0–45)

## 2018-12-07 PROCEDURE — 36415 COLL VENOUS BLD VENIPUNCTURE: CPT | Performed by: PHYSICIAN ASSISTANT

## 2018-12-07 PROCEDURE — 84460 ALANINE AMINO (ALT) (SGPT): CPT | Performed by: PHYSICIAN ASSISTANT

## 2018-12-07 PROCEDURE — 84450 TRANSFERASE (AST) (SGOT): CPT | Performed by: PHYSICIAN ASSISTANT

## 2019-01-25 DIAGNOSIS — Z98.61 STATUS POST PERCUTANEOUS TRANSLUMINAL CORONARY ANGIOPLASTY: ICD-10-CM

## 2019-01-28 DIAGNOSIS — Z98.61 STATUS POST PERCUTANEOUS TRANSLUMINAL CORONARY ANGIOPLASTY: ICD-10-CM

## 2019-01-28 NOTE — TELEPHONE ENCOUNTER
"Requested Prescriptions   Pending Prescriptions Disp Refills     ticagrelor (BRILINTA) 90 MG tablet  Last Written Prescription Date:  10/26/18  Last Fill Quantity: 180,  # refills: 3   Last office visit: 10/23/2018 with prescribing provider:  Arianna   Future Office Visit:   Next 5 appointments (look out 90 days)    Mar 22, 2019  8:30 AM CDT  Return Visit with Sammy Sorenson MD  [facility] [address] [place] [id number] [phone]          180 tablet 3     Sig: Take 1 tablet (90 mg) by mouth 2 times daily    Platelet Inhibitors Failed - 1/28/2019  2:30 PM       Failed - Normal HGB on file in past 12 months    Recent Labs   Lab Test 10/20/18  0500   HGB 11.1*              Passed - Normal ALT on file in past 12 months    Recent Labs   Lab Test 12/07/18  0813   ALT 28            Passed - Normal AST on file in past 12 months    Recent Labs   Lab Test 12/07/18  0813   AST 18            Passed - Normal Platelets on file in past 12 months    Recent Labs   Lab Test 10/20/18  0500                 Passed - Recent (12 mo) or future (30 days) visit within the authorizing provider's specialty    Patient had office visit in the last 12 months or has a visit in the next 30 days with authorizing provider or within the authorizing provider's specialty.  See \"Patient Info\" tab in inbasket, or \"Choose Columns\" in Meds & Orders section of the refill encounter.             Passed - Medication is active on med list       Passed - Patient is age 18 or older       Passed - Normal serum creatinine on file in past 12 months    Recent Labs   Lab Test 10/26/18  0913   CR 1.12               "

## 2019-01-29 RX ORDER — METOPROLOL SUCCINATE 25 MG/1
TABLET, EXTENDED RELEASE ORAL
Qty: 180 TABLET | Refills: 0 | Status: SHIPPED | OUTPATIENT
Start: 2019-01-29 | End: 2019-06-30

## 2019-01-29 NOTE — TELEPHONE ENCOUNTER
Bid dosing was d/c'd on 12/6/18: routing to provider to advise on refill request    Conversation: Clinic Care Coordination - Follow-up   (Newest Message First)   Yanna Varma RN       12/6/18 4:03 PM   Note      Pt had lower BPs w/ slight LH at cardiac rehab yesterday (12/5/18). Per Uma Longo PA-C, pt should decrease Toprol XL from 25 mg BID to 25 mg daily. Updated med list.   Called pt, reviewed Uma's recommendations. Pt bought BP cuff yesterday, will check it at home. Reviewed to take BP at least 2 hrs after taking Toprol XL. If lower BPs - drink some water. If no resolution of sxs/lower BPs - call RN line. If pt wants to check his home cuff, can always have RN visit to verify accuracy and check manual BP. Pt asked if BB could cause erectile dysfunction; advised it is a possible side effect, however try the lower dose and if this is still a problem for him, call us back and we can discuss alternative med management. Should also discuss this w/ Dr. Sorenson when he sees him (on the wait list currently). Pt agreed w/ plan.   Kiley RODRÍGUEZ

## 2019-01-30 NOTE — TELEPHONE ENCOUNTER
[facility] requesting refill  Denied - cardiology manages this medication  Melissa HAMMONDS RN

## 2019-02-05 ENCOUNTER — TELEPHONE (OUTPATIENT)
Dept: INTERNAL MEDICINE | Facility: CLINIC | Age: 44
End: 2019-02-05

## 2019-02-05 DIAGNOSIS — Z98.61 STATUS POST PERCUTANEOUS TRANSLUMINAL CORONARY ANGIOPLASTY: ICD-10-CM

## 2019-02-05 NOTE — TELEPHONE ENCOUNTER
Pt calling to check status on rx for Brillinta, It was explain to the pt what Naomi note stated. Pt states that Dr. Keller has refilled this rx in the past and is asking for a 90day supply.

## 2019-02-05 NOTE — TELEPHONE ENCOUNTER
Dr. Keller did provide refill of Brilinta on 10/23/18.     Patient will be out of medication in 2 weeks.     Routing refill request to provider for review/approval because:  Last order placed by cardiology, patient requesting this be filled by Dr. Keller

## 2019-02-05 NOTE — TELEPHONE ENCOUNTER
Received refill request for:  Brilinta  Last OV was: 10/26/19 w/ Uma YOUNG  F/U scheduled: OV scheduled 3/22/19 w/   [facility]    S/p JOHN x2 to OM 10/18/18. Pt needs to remain on Brilinta until 10/2019.

## 2019-02-24 DIAGNOSIS — Z98.61 STATUS POST PERCUTANEOUS TRANSLUMINAL CORONARY ANGIOPLASTY: ICD-10-CM

## 2019-02-25 NOTE — TELEPHONE ENCOUNTER
"Requested Prescriptions   Pending Prescriptions Disp Refills     BRILINTA 90 MG tablet [Pharmacy Med Name: BRILINTA TABS 60'S 90MG]  Last Written Prescription Date:  2/5/2019  Last Fill Quantity: 180,  # refills: 2   Last office visit: 10/23/2018 with prescribing provider:     Future Office Visit:   Next 5 appointments (look out 90 days)    Mar 22, 2019  8:30 AM CDT  Return Visit with Sammy Sorenson MD  [facility] [address] [address] [address] [phone]        180 tablet 0     Sig: TAKE 1 TABLET TWICE A DAY    Platelet Inhibitors Failed - 2/24/2019  4:48 AM       Failed - Normal HGB on file in past 12 months    Recent Labs   Lab Test 10/20/18  0500   HGB 11.1*              Passed - Normal ALT on file in past 12 months    Recent Labs   Lab Test 12/07/18  0813   ALT 28            Passed - Normal AST on file in past 12 months    Recent Labs   Lab Test 12/07/18  0813   AST 18            Passed - Normal Platelets on file in past 12 months    Recent Labs   Lab Test 10/20/18  0500                 Passed - Recent (12 mo) or future (30 days) visit within the authorizing provider's specialty    Patient had office visit in the last 12 months or has a visit in the next 30 days with authorizing provider or within the authorizing provider's specialty.  See \"Patient Info\" tab in inbasket, or \"Choose Columns\" in Meds & Orders section of the refill encounter.             Passed - Medication is active on med list       Passed - Patient is age 18 or older       Passed - Normal serum creatinine on file in past 12 months    Recent Labs   Lab Test 10/26/18  0913   CR 1.12             "

## 2019-02-27 RX ORDER — TICAGRELOR 90 MG/1
TABLET ORAL
Qty: 180 TABLET | Refills: 0 | OUTPATIENT
Start: 2019-02-27

## 2019-03-22 ENCOUNTER — OFFICE VISIT (OUTPATIENT)
Dept: CARDIOLOGY | Facility: CLINIC | Age: 44
End: 2019-03-22
Payer: COMMERCIAL

## 2019-03-22 VITALS
WEIGHT: 152 LBS | HEIGHT: 65 IN | SYSTOLIC BLOOD PRESSURE: 106 MMHG | HEART RATE: 70 BPM | BODY MASS INDEX: 25.33 KG/M2 | DIASTOLIC BLOOD PRESSURE: 68 MMHG

## 2019-03-22 DIAGNOSIS — Z95.5 S/P CORONARY ARTERY STENT PLACEMENT: ICD-10-CM

## 2019-03-22 DIAGNOSIS — I21.4 NSTEMI (NON-ST ELEVATED MYOCARDIAL INFARCTION) (H): ICD-10-CM

## 2019-03-22 DIAGNOSIS — E78.5 HYPERLIPIDEMIA LDL GOAL <70: ICD-10-CM

## 2019-03-22 DIAGNOSIS — I25.10 CORONARY ARTERY DISEASE INVOLVING NATIVE CORONARY ARTERY OF NATIVE HEART WITHOUT ANGINA PECTORIS: Primary | ICD-10-CM

## 2019-03-22 PROCEDURE — 99214 OFFICE O/P EST MOD 30 MIN: CPT | Performed by: INTERNAL MEDICINE

## 2019-03-22 ASSESSMENT — MIFFLIN-ST. JEOR: SCORE: 1501.35

## 2019-03-22 NOTE — PROGRESS NOTES
HPI and Plan:   This 43-year-old gentleman is seen in follow-up of a recent inferolateral acute MI.    He presented in September s 2018 with several days of chest burning, left shoulder and arm aching and some shortness of breath.  By labs and history he was about 2 days out from an acute infarction.  Angiography subsequent demonstrated a culprit lesion with a thrombotic but patent circumflex and marginal coronary artery which were stented.    He states he is now back to his normal activities.  He works out regularly including weightlifting.  At one time he was getting lightheaded and hypotensive after weight lifting but after he cut back on his metoprolol that has resolved.  With working out and weightlifting he is not having any of the ischemic symptoms he had with his MI.  He is not having any other chest discomfort, unusual fatigue, or dyspnea.  He notes about once a week he awakens in the middle of night with some mild heartburn but does not notice his symptoms any other time.    He has a marked family history of premature coronary disease.  He has a history of dyslipidemia but he does not remember the exact numbers.  He was started on statins prior to coming here.  We do not know what his baseline LDL was.  At the time he presented to the hospital his LDL was very good at 59 with normal triglycerides and HDL of 53, but this was 2 days into an infarct and could have been a in accurate measurement of his baseline therefore.  Again we also do not know what his lipids profile was prior to starting statins.  At one time he had been on 20 mg a day but cut it back to 10 mg a day on his own because he thought the profile was good.    He otherwise has no symptoms.  Blood pressure has been well controlled and is 106/68 today.  He has had some borderline elevated fasting glucoses and is aware of this and is working on lifestyle intervention.  We did discuss an appropriate diet today.    Exam is deal told below and is  normal today including normal cardiac, pulmonary, and peripheral vascular exam.    He had a follow-up echocardiogram and no November 30, 2018.  I personally reviewed that.  It does show some inferior more inferolateral hypokinesis.  Normal valves.  Normal right ventricular function.  Estimated ejection fraction at 50%.  This was all reviewed with him today.      Impression/plan    1-recent inferolateral MI.  Stable post stenting.  Mild ischemic cardiomyopathy.  Asymptomatic for heart failure, arrhythmia, ischemia.  He is on beta-blocker at a tolerated dose.  Blood pressure is not tolerated vasodilator therapy but it is not really indicated by his current status.  There is no history of hypertension.    2-coronary artery disease.  He had only mild disease elsewhere.  Importance of intensive risk factor intervention discussed with him.  The main things we can do at this time for him are very aggressive LDL control.  I am going to repeat a lipid profile now to see what his real baseline is, also he says he will try to obtain his previous pre-statin lipid profile so that we make sure that we have reduced LDL by at least 50% from baseline.    3-status post drug-eluting stent.  Tolerating dual antiplatelet therapy.  No bleeding issues.  Reinforced the importance of continuing this for a year and he agrees.    4-dyslipidemia.  As above will check a profile at this time and get his old profile so we can assess the goal of optimal risk reduction profile.    Otherwise we will have him back in about 7 months to discuss discontinuation of Brilinta.  Will await the results of the lipid profile in about a week.  He will continue his exercise regimen and lifestyle intervention.      Orders Placed This Encounter   Procedures     Lipid Profile     Follow-Up with Cardiologist       No orders of the defined types were placed in this encounter.      There are no discontinued medications.      Encounter Diagnoses   Name Primary?      Coronary artery disease involving native coronary artery of native heart without angina pectoris Yes     Hyperlipidemia LDL goal <70      NSTEMI (non-ST elevated myocardial infarction) (H)      S/P coronary artery stent placement        CURRENT MEDICATIONS:  Current Outpatient Medications   Medication Sig Dispense Refill     acetaminophen 500 MG CAPS Take by mouth as needed       aspirin 81 MG tablet Take 81 mg by mouth daily       atorvastatin (LIPITOR) 10 MG tablet Take 2 tablets (20 mg) by mouth every evening (Patient taking differently: Take 10 mg by mouth daily ) 180 tablet 0     Cholecalciferol (VITAMIN D) 2000 units tablet Take 1 tablet by mouth every other day       magnesium 500 MG TABS Take by mouth daily       metoprolol succinate ER (TOPROL-XL) 25 MG 24 hr tablet TAKE 1 TABLET TWICE A DAY (Patient taking differently: No sig reported) 180 tablet 0     nitroGLYcerin (NITROSTAT) 0.4 MG sublingual tablet For chest pain place 1 tablet under the tongue every 5 minutes for 3 doses. If symptoms persist 5 minutes after 1st dose call 911. 25 tablet 1     ticagrelor (BRILINTA) 90 MG tablet Take 1 tablet (90 mg) by mouth 2 times daily 180 tablet 2     colchicine (COLCYRS) 0.6 MG tablet Take 1 tablet (0.6 mg) by mouth daily (Patient not taking: Reported on 3/22/2019) 28 tablet 0       ALLERGIES   No Known Allergies    PAST MEDICAL HISTORY:  Past Medical History:   Diagnosis Date     ACS (acute coronary syndrome) (H)      CHF (congestive heart failure) (H)      Glucose intolerance      Hyperlipidemia      Myocardial infarct (H)        PAST SURGICAL HISTORY:  Past Surgical History:   Procedure Laterality Date     NO HISTORY OF SURGERY         FAMILY HISTORY:  Family History   Problem Relation Age of Onset     Coronary Artery Disease Father      Myocardial Infarction Brother        SOCIAL HISTORY:  Social History     Socioeconomic History     Marital status:      Spouse name: None     Number of children: None      "Years of education: None     Highest education level: None   Occupational History     None   Social Needs     Financial resource strain: None     Food insecurity:     Worry: None     Inability: None     Transportation needs:     Medical: None     Non-medical: None   Tobacco Use     Smoking status: Never Smoker     Smokeless tobacco: Never Used   Substance and Sexual Activity     Alcohol use: No     Drug use: No     Sexual activity: Yes     Partners: Female   Lifestyle     Physical activity:     Days per week: None     Minutes per session: None     Stress: None   Relationships     Social connections:     Talks on phone: None     Gets together: None     Attends Holiness service: None     Active member of club or organization: None     Attends meetings of clubs or organizations: None     Relationship status: None     Intimate partner violence:     Fear of current or ex partner: None     Emotionally abused: None     Physically abused: None     Forced sexual activity: None   Other Topics Concern     Parent/sibling w/ CABG, MI or angioplasty before 65F 55M? Not Asked   Social History Narrative     None       Review of Systems:  Skin:  Negative for       Eyes:  Negative for      ENT:  Negative for      Respiratory:  Negative for       Cardiovascular:  Negative      Gastroenterology: Positive for heartburn    Genitourinary:  Negative for      Musculoskeletal:  Negative      Neurologic:  Negative for      Psychiatric:  Negative for      Heme/Lymph/Imm:  Negative      Endocrine:  Positive for   prediabetic    Physical Exam:  Vitals: /68   Pulse 70   Ht 1.651 m (5' 5\")   Wt 68.9 kg (152 lb)   BMI 25.29 kg/m      Constitutional:  cooperative, alert and oriented, well developed, well nourished, in no acute distress        Skin:  warm and dry to the touch, no apparent skin lesions or masses noted          Head:  normocephalic, no masses or lesions        Eyes:  pupils equal and round;conjunctivae and lids " unremarkable;sclera white;EOMS intact        Lymph:      ENT:  no pallor or cyanosis        Neck:  carotid pulses are full and equal bilaterally, JVP normal, no carotid bruit        Respiratory:  normal breath sounds, clear to auscultation, normal A-P diameter, normal symmetry, normal respiratory excursion, no use of accessory muscles         Cardiac: regular rhythm, normal S1/S2, no S3 or S4, apical impulse not displaced, no murmurs, gallops or rubs                pulses full and equal, no bruits auscultated                                        GI:  abdomen soft, non-tender, BS normoactive, no mass, no HSM, no bruits        Extremities and Muscular Skeletal:  no deformities, clubbing, cyanosis, erythema observed;no edema              Neurological:  no gross motor deficits        Psych:  affect appropriate, oriented to time, person and place        CC  No referring provider defined for this encounter.

## 2019-03-22 NOTE — LETTER
3/22/2019    Izabela Keller MD  [address] [facility] [address]    RE: Neptali Duong       Dear Colleague,    I had the pleasure of seeing Neptali Duong in the [facility].    HPI and Plan:   This 43-year-old gentleman is seen in follow-up of a recent inferolateral acute MI.    He presented in September s 2018 with several days of chest burning, left shoulder and arm aching and some shortness of breath.  By labs and history he was about 2 days out from an acute infarction.  Angiography subsequent demonstrated a culprit lesion with a thrombotic but patent circumflex and marginal coronary artery which were stented.    He states he is now back to his normal activities.  He works out regularly including weightlifting.  At one time he was getting lightheaded and hypotensive after weight lifting but after he cut back on his metoprolol that has resolved.  With working out and weightlifting he is not having any of the ischemic symptoms he had with his MI.  He is not having any other chest discomfort, unusual fatigue, or dyspnea.  He notes about once a week he awakens in the middle of night with some mild heartburn but does not notice his symptoms any other time.    He has a marked family history of premature coronary disease.  He has a history of dyslipidemia but he does not remember the exact numbers.  He was started on statins prior to coming here.  We do not know what his baseline LDL was.  At the time he presented to the hospital his LDL was very good at 59 with normal triglycerides and HDL of 53, but this was 2 days into an infarct and could have been a in accurate measurement of his baseline therefore.  Again we also do not know what his lipids profile was prior to starting statins.  At one time he had been on 20 mg a day but cut it back to 10 mg a day on his own because he thought the profile was good.    He otherwise has no symptoms.  Blood pressure has been well controlled and is  106/68 today.  He has had some borderline elevated fasting glucoses and is aware of this and is working on lifestyle intervention.  We did discuss an appropriate diet today.    Exam is deal told below and is normal today including normal cardiac, pulmonary, and peripheral vascular exam.    He had a follow-up echocardiogram and no November 30, 2018.  I personally reviewed that.  It does show some inferior more inferolateral hypokinesis.  Normal valves.  Normal right ventricular function.  Estimated ejection fraction at 50%.  This was all reviewed with him today.      Impression/plan    1-recent inferolateral MI.  Stable post stenting.  Mild ischemic cardiomyopathy.  Asymptomatic for heart failure, arrhythmia, ischemia.  He is on beta-blocker at a tolerated dose.  Blood pressure is not tolerated vasodilator therapy but it is not really indicated by his current status.  There is no history of hypertension.    2-coronary artery disease.  He had only mild disease elsewhere.  Importance of intensive risk factor intervention discussed with him.  The main things we can do at this time for him are very aggressive LDL control.  I am going to repeat a lipid profile now to see what his real baseline is, also he says he will try to obtain his previous pre-statin lipid profile so that we make sure that we have reduced LDL by at least 50% from baseline.    3-status post drug-eluting stent.  Tolerating dual antiplatelet therapy.  No bleeding issues.  Reinforced the importance of continuing this for a year and he agrees.    4-dyslipidemia.  As above will check a profile at this time and get his old profile so we can assess the goal of optimal risk reduction profile.    Otherwise we will have him back in about 7 months to discuss discontinuation of Brilinta.  Will await the results of the lipid profile in about a week.  He will continue his exercise regimen and lifestyle intervention.      Orders Placed This Encounter   Procedures      Lipid Profile     Follow-Up with Cardiologist       No orders of the defined types were placed in this encounter.      There are no discontinued medications.      Encounter Diagnoses   Name Primary?     Coronary artery disease involving native coronary artery of native heart without angina pectoris Yes     Hyperlipidemia LDL goal <70      NSTEMI (non-ST elevated myocardial infarction) (H)      S/P coronary artery stent placement        CURRENT MEDICATIONS:  Current Outpatient Medications   Medication Sig Dispense Refill     acetaminophen 500 MG CAPS Take by mouth as needed       aspirin 81 MG tablet Take 81 mg by mouth daily       atorvastatin (LIPITOR) 10 MG tablet Take 2 tablets (20 mg) by mouth every evening (Patient taking differently: Take 10 mg by mouth daily ) 180 tablet 0     Cholecalciferol (VITAMIN D) 2000 units tablet Take 1 tablet by mouth every other day       magnesium 500 MG TABS Take by mouth daily       metoprolol succinate ER (TOPROL-XL) 25 MG 24 hr tablet TAKE 1 TABLET TWICE A DAY (Patient taking differently: No sig reported) 180 tablet 0     nitroGLYcerin (NITROSTAT) 0.4 MG sublingual tablet For chest pain place 1 tablet under the tongue every 5 minutes for 3 doses. If symptoms persist 5 minutes after 1st dose call 911. 25 tablet 1     ticagrelor (BRILINTA) 90 MG tablet Take 1 tablet (90 mg) by mouth 2 times daily 180 tablet 2     colchicine (COLCYRS) 0.6 MG tablet Take 1 tablet (0.6 mg) by mouth daily (Patient not taking: Reported on 3/22/2019) 28 tablet 0       ALLERGIES   No Known Allergies    PAST MEDICAL HISTORY:  Past Medical History:   Diagnosis Date     ACS (acute coronary syndrome) (H)      CHF (congestive heart failure) (H)      Glucose intolerance      Hyperlipidemia      Myocardial infarct (H)        PAST SURGICAL HISTORY:  Past Surgical History:   Procedure Laterality Date     NO HISTORY OF SURGERY         FAMILY HISTORY:  Family History   Problem Relation Age of Onset      "Coronary Artery Disease Father      Myocardial Infarction Brother        SOCIAL HISTORY:  Social History     Socioeconomic History     Marital status:      Spouse name: None     Number of children: None     Years of education: None     Highest education level: None   Occupational History     None   Social Needs     Financial resource strain: None     Food insecurity:     Worry: None     Inability: None     Transportation needs:     Medical: None     Non-medical: None   Tobacco Use     Smoking status: Never Smoker     Smokeless tobacco: Never Used   Substance and Sexual Activity     Alcohol use: No     Drug use: No     Sexual activity: Yes     Partners: Female   Lifestyle     Physical activity:     Days per week: None     Minutes per session: None     Stress: None   Relationships     Social connections:     Talks on phone: None     Gets together: None     Attends Orthodoxy service: None     Active member of club or organization: None     Attends meetings of clubs or organizations: None     Relationship status: None     Intimate partner violence:     Fear of current or ex partner: None     Emotionally abused: None     Physically abused: None     Forced sexual activity: None   Other Topics Concern     Parent/sibling w/ CABG, MI or angioplasty before 65F 55M? Not Asked   Social History Narrative     None       Review of Systems:  Skin:  Negative for       Eyes:  Negative for      ENT:  Negative for      Respiratory:  Negative for       Cardiovascular:  Negative      Gastroenterology: Positive for heartburn    Genitourinary:  Negative for      Musculoskeletal:  Negative      Neurologic:  Negative for      Psychiatric:  Negative for      Heme/Lymph/Imm:  Negative      Endocrine:  Positive for   prediabetic    Physical Exam:  Vitals: /68   Pulse 70   Ht 1.651 m (5' 5\")   Wt 68.9 kg (152 lb)   BMI 25.29 kg/m       Constitutional:  cooperative, alert and oriented, well developed, well nourished, in no acute " distress        Skin:  warm and dry to the touch, no apparent skin lesions or masses noted          Head:  normocephalic, no masses or lesions        Eyes:  pupils equal and round;conjunctivae and lids unremarkable;sclera white;EOMS intact        Lymph:      ENT:  no pallor or cyanosis        Neck:  carotid pulses are full and equal bilaterally, JVP normal, no carotid bruit        Respiratory:  normal breath sounds, clear to auscultation, normal A-P diameter, normal symmetry, normal respiratory excursion, no use of accessory muscles         Cardiac: regular rhythm, normal S1/S2, no S3 or S4, apical impulse not displaced, no murmurs, gallops or rubs                pulses full and equal, no bruits auscultated                                        GI:  abdomen soft, non-tender, BS normoactive, no mass, no HSM, no bruits        Extremities and Muscular Skeletal:  no deformities, clubbing, cyanosis, erythema observed;no edema              Neurological:  no gross motor deficits        Psych:  affect appropriate, oriented to time, person and place          Thank you for allowing me to participate in the care of your patient.    Sincerely,     Sammy Sorenson MD     [facility]

## 2019-03-25 DIAGNOSIS — I25.10 CORONARY ARTERY DISEASE INVOLVING NATIVE CORONARY ARTERY OF NATIVE HEART WITHOUT ANGINA PECTORIS: ICD-10-CM

## 2019-03-25 DIAGNOSIS — E78.5 HYPERLIPIDEMIA LDL GOAL <70: ICD-10-CM

## 2019-03-25 LAB
CHOLEST SERPL-MCNC: 148 MG/DL
HDLC SERPL-MCNC: 50 MG/DL
LDLC SERPL CALC-MCNC: 77 MG/DL
NONHDLC SERPL-MCNC: 98 MG/DL
TRIGL SERPL-MCNC: 106 MG/DL

## 2019-03-25 PROCEDURE — 80061 LIPID PANEL: CPT | Performed by: INTERNAL MEDICINE

## 2019-03-25 PROCEDURE — 36415 COLL VENOUS BLD VENIPUNCTURE: CPT | Performed by: INTERNAL MEDICINE

## 2019-03-27 DIAGNOSIS — E78.5 HYPERLIPIDEMIA LDL GOAL <70: ICD-10-CM

## 2019-03-27 DIAGNOSIS — I25.10 CORONARY ARTERY DISEASE INVOLVING NATIVE CORONARY ARTERY OF NATIVE HEART WITHOUT ANGINA PECTORIS: Primary | ICD-10-CM

## 2019-06-30 DIAGNOSIS — Z98.61 STATUS POST PERCUTANEOUS TRANSLUMINAL CORONARY ANGIOPLASTY: ICD-10-CM

## 2019-07-02 RX ORDER — METOPROLOL SUCCINATE 25 MG/1
25 TABLET, EXTENDED RELEASE ORAL DAILY
Qty: 180 TABLET | Refills: 0 | Status: SHIPPED | OUTPATIENT
Start: 2019-07-02 | End: 2019-12-12

## 2019-07-02 NOTE — TELEPHONE ENCOUNTER
Please refer to refill encounter 1/25/19. Per refill encounter:    Bid dosing was d/c'd on 12/6/18: routing to provider to advise on refill request    Please see rationale, prescription for bid dosing was ordered at that time. Pended for once daily, Please advise,     Thank you     Routing refill request to provider for review/approval because:  Clarification needed

## 2019-07-08 ENCOUNTER — MEDICAL CORRESPONDENCE (OUTPATIENT)
Dept: HEALTH INFORMATION MANAGEMENT | Facility: CLINIC | Age: 44
End: 2019-07-08

## 2019-07-08 NOTE — TELEPHONE ENCOUNTER
Received form from [facility] on clarification on Metoprolol dosing, PCP signed off on 1 tab po every day, #90 ) 0 refills, faxed form back

## 2019-07-19 DIAGNOSIS — I24.9 ACS (ACUTE CORONARY SYNDROME) (H): ICD-10-CM

## 2019-07-19 RX ORDER — ATORVASTATIN CALCIUM 10 MG/1
TABLET, FILM COATED ORAL
Qty: 180 TABLET | Refills: 0 | Status: SHIPPED | OUTPATIENT
Start: 2019-07-19 | End: 2019-12-23

## 2019-07-19 NOTE — TELEPHONE ENCOUNTER
"Requested Prescriptions   Pending Prescriptions Disp Refills     atorvastatin (LIPITOR) 10 MG tablet [Pharmacy Med Name: ATORVASTATIN TABS 10MG] 180 tablet 0     Sig: TAKE 2 TABLETS EVERY EVENING       Statins Protocol Passed - 7/19/2019 12:06 PM        Passed - LDL on file in past 12 months     Recent Labs   Lab Test 03/25/19  0814   LDL 77             Passed - No abnormal creatine kinase in past 12 months     No lab results found.             Passed - Recent (12 mo) or future (30 days) visit within the authorizing provider's specialty     Patient had office visit in the last 12 months or has a visit in the next 30 days with authorizing provider or within the authorizing provider's specialty.  See \"Patient Info\" tab in inbasket, or \"Choose Columns\" in Meds & Orders section of the refill encounter.              Passed - Medication is active on med list        Passed - Patient is age 18 or older        Last office visit with primary care provider 10/23/18 patient advised follow up in 6 months. Last lipids 3/25/19 per Cardiology. Per result note: Patient Result Comments     Viewed by Neptali Duong on 3/28/2019  8:39 AM   Written by Leeanna Yañez, RN on 3/27/2019 10:59 AM   Hi Dr. Yas Gunderson reviewed your cholesterol levels and he would like you to increase your atorvastatin to 80mg daily and then have your cholesterol checked again in 3 months. I have placed an order for you to have labs checked at the end of June. You can call scheduling at [phone] to schedule a lab appointment.     If you have any further questions or concerns, please feel free to message us back or call us at [phone]     Thank you,   Leeanna, RN with Dr. Sorenson     Per note on medication list dated 3/22/19 patient was taking Atorvastatin 10 mg daily. No new prescription was sent with above result note so with last prescription dated 10/31/18 #180 patient should have been out of medication a very long time ago.    Routing refill " request to Dr. Sorenson as it appears they are currently monitoring cholesterol.

## 2019-11-05 ENCOUNTER — MYC MEDICAL ADVICE (OUTPATIENT)
Dept: INTERNAL MEDICINE | Facility: CLINIC | Age: 44
End: 2019-11-05

## 2019-11-05 DIAGNOSIS — Z00.00 ROUTINE GENERAL MEDICAL EXAMINATION AT A HEALTH CARE FACILITY: Primary | ICD-10-CM

## 2019-11-05 NOTE — TELEPHONE ENCOUNTER
Patient sent [facility] message stating she is due for annual physical and asks if she should have labs now or at her appointment. Patient wanting to have A1c and CRP for inflammation included with preventative lab orders.

## 2019-11-06 ENCOUNTER — MYC MEDICAL ADVICE (OUTPATIENT)
Dept: INTERNAL MEDICINE | Facility: CLINIC | Age: 44
End: 2019-11-06

## 2019-11-06 NOTE — TELEPHONE ENCOUNTER
Ordered labs for physical.  Did not order CRP, as this is a nonspecific test and usually only order if symptoms.

## 2019-11-07 NOTE — TELEPHONE ENCOUNTER
Patient requesting to stop Metoprolol due to it causing adverse effects on male sexual performance and asks if there is an alternative to taking a beta blocker. He also asks if he can quit taking Brillenta as he has been taking this more than 12 months.     Routed to covering provider - Aneta Vallejo, to review.

## 2019-11-07 NOTE — TELEPHONE ENCOUNTER
He  should make appointment with Izabela to discuss the medication. She has not seen him for a year.10/2018.    In her note a year ago, she said cardiology would decide in a year if ok to stop Brilinta. Also said they would adjust his medication - metoprolol.        Cardiology note 3/2019  Otherwise we will have him back in about 7 months to discuss discontinuation of Brilinta.  Will await the results of the lipid profile in about a week.  He will continue his exercise regimen and lifestyle intervention.    So he needs appointment with cardiology as well

## 2019-11-09 DIAGNOSIS — Z00.00 ROUTINE GENERAL MEDICAL EXAMINATION AT A HEALTH CARE FACILITY: ICD-10-CM

## 2019-11-09 LAB
ALBUMIN SERPL-MCNC: 4.4 G/DL (ref 3.4–5)
ALP SERPL-CCNC: 59 U/L (ref 40–150)
ALT SERPL W P-5'-P-CCNC: 30 U/L (ref 0–70)
ANION GAP SERPL CALCULATED.3IONS-SCNC: 4 MMOL/L (ref 3–14)
AST SERPL W P-5'-P-CCNC: 16 U/L (ref 0–45)
BASOPHILS # BLD AUTO: 0.1 10E9/L (ref 0–0.2)
BASOPHILS NFR BLD AUTO: 1 %
BILIRUB SERPL-MCNC: 0.6 MG/DL (ref 0.2–1.3)
BUN SERPL-MCNC: 20 MG/DL (ref 7–30)
CALCIUM SERPL-MCNC: 8.9 MG/DL (ref 8.5–10.1)
CHLORIDE SERPL-SCNC: 107 MMOL/L (ref 94–109)
CHOLEST SERPL-MCNC: 158 MG/DL
CO2 SERPL-SCNC: 28 MMOL/L (ref 20–32)
CREAT SERPL-MCNC: 1.08 MG/DL (ref 0.66–1.25)
DIFFERENTIAL METHOD BLD: NORMAL
EOSINOPHIL # BLD AUTO: 0.5 10E9/L (ref 0–0.7)
EOSINOPHIL NFR BLD AUTO: 8.7 %
ERYTHROCYTE [DISTWIDTH] IN BLOOD BY AUTOMATED COUNT: 13 % (ref 10–15)
GFR SERPL CREATININE-BSD FRML MDRD: 82 ML/MIN/{1.73_M2}
GLUCOSE SERPL-MCNC: 94 MG/DL (ref 70–99)
HBA1C MFR BLD: 5.7 % (ref 0–5.6)
HCT VFR BLD AUTO: 40 % (ref 40–53)
HDLC SERPL-MCNC: 57 MG/DL
HGB BLD-MCNC: 13.5 G/DL (ref 13.3–17.7)
LDLC SERPL CALC-MCNC: 85 MG/DL
LYMPHOCYTES # BLD AUTO: 1.8 10E9/L (ref 0.8–5.3)
LYMPHOCYTES NFR BLD AUTO: 28.8 %
MCH RBC QN AUTO: 30.2 PG (ref 26.5–33)
MCHC RBC AUTO-ENTMCNC: 33.8 G/DL (ref 31.5–36.5)
MCV RBC AUTO: 90 FL (ref 78–100)
MONOCYTES # BLD AUTO: 0.5 10E9/L (ref 0–1.3)
MONOCYTES NFR BLD AUTO: 7.7 %
NEUTROPHILS # BLD AUTO: 3.3 10E9/L (ref 1.6–8.3)
NEUTROPHILS NFR BLD AUTO: 53.8 %
NONHDLC SERPL-MCNC: 101 MG/DL
PLATELET # BLD AUTO: 201 10E9/L (ref 150–450)
POTASSIUM SERPL-SCNC: 3.9 MMOL/L (ref 3.4–5.3)
PROT SERPL-MCNC: 7.3 G/DL (ref 6.8–8.8)
RBC # BLD AUTO: 4.47 10E12/L (ref 4.4–5.9)
SODIUM SERPL-SCNC: 139 MMOL/L (ref 133–144)
TRIGL SERPL-MCNC: 79 MG/DL
TSH SERPL DL<=0.005 MIU/L-ACNC: 1.94 MU/L (ref 0.4–4)
WBC # BLD AUTO: 6.1 10E9/L (ref 4–11)

## 2019-11-09 PROCEDURE — 80061 LIPID PANEL: CPT | Performed by: INTERNAL MEDICINE

## 2019-11-09 PROCEDURE — 36415 COLL VENOUS BLD VENIPUNCTURE: CPT | Performed by: INTERNAL MEDICINE

## 2019-11-09 PROCEDURE — 80050 GENERAL HEALTH PANEL: CPT | Performed by: INTERNAL MEDICINE

## 2019-11-09 PROCEDURE — 83036 HEMOGLOBIN GLYCOSYLATED A1C: CPT | Performed by: INTERNAL MEDICINE

## 2019-12-02 ENCOUNTER — TELEPHONE (OUTPATIENT)
Dept: CARDIOLOGY | Facility: CLINIC | Age: 44
End: 2019-12-02

## 2019-12-02 DIAGNOSIS — Z98.61 STATUS POST PERCUTANEOUS TRANSLUMINAL CORONARY ANGIOPLASTY: ICD-10-CM

## 2019-12-02 NOTE — TELEPHONE ENCOUNTER
Received call from pt asking if he can stop Brilinta. Reviewed that Dr. Sorenson recommended pt be seen in clinic prior to making medication changes. Pt was agreeable to scheduling an appointment with HECTOR Eaton to review. Pt scheduled on 12/12/19 at 8:30 am.

## 2019-12-12 ENCOUNTER — OFFICE VISIT (OUTPATIENT)
Dept: CARDIOLOGY | Facility: CLINIC | Age: 44
End: 2019-12-12
Payer: COMMERCIAL

## 2019-12-12 VITALS
HEART RATE: 72 BPM | HEIGHT: 65 IN | DIASTOLIC BLOOD PRESSURE: 74 MMHG | SYSTOLIC BLOOD PRESSURE: 110 MMHG | WEIGHT: 156 LBS | BODY MASS INDEX: 25.99 KG/M2

## 2019-12-12 DIAGNOSIS — E78.5 HYPERLIPIDEMIA LDL GOAL <70: ICD-10-CM

## 2019-12-12 DIAGNOSIS — Z95.5 S/P CORONARY ARTERY STENT PLACEMENT: ICD-10-CM

## 2019-12-12 DIAGNOSIS — I25.10 CORONARY ARTERY DISEASE INVOLVING NATIVE CORONARY ARTERY OF NATIVE HEART WITHOUT ANGINA PECTORIS: Primary | ICD-10-CM

## 2019-12-12 PROCEDURE — 99214 OFFICE O/P EST MOD 30 MIN: CPT | Performed by: PHYSICIAN ASSISTANT

## 2019-12-12 ASSESSMENT — MIFFLIN-ST. JEOR: SCORE: 1514.49

## 2019-12-12 NOTE — LETTER
12/12/2019      Izabela Keller MD  [address] [facility] [address]      RE: Neptali Duong       Dear Colleague,    I had the pleasure of seeing Neptali Duong in the [facility].    Service Date: 12/12/2019      HISTORY OF PRESENTING COMPLAINT:  Mr. Duong is a pleasant 44-year-old gentleman who presents to the office today for routine followup.      His cardiovascular history includes dyslipidemia, a family history of premature coronary artery disease and a history of an acute inferolateral MI in 09/2018, at which time he presented with several days of chest burning left shoulder and arm discomfort and shortness of breath.  Coronary angiography demonstrated a culprit lesion in the circumflex and severe disease in an OM3.  He underwent drug-eluting stent placement x2.  He was found to have mild to moderate disease in the remainder of the coronary tree.  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  Conservative management was recommended as the patient was not having any symptoms of claudication.  Following his MI, he was left with a mild ischemic cardiomyopathy.  His last echocardiogram in late 11/2018 showed an EF in the 50%-55% range.  Dr. Sorenson did review the echocardiogram and felt that there was some inferior/inferolateral wall hypokinesis.      The patient's main concern today is about potential side effects from metoprolol.  He states that early on, he was on 50 mg of metoprolol and had begun feeling lightheaded, hypotensive and short of breath when working out, and the dose was initially reduced to 25 mg daily.  It has subsequently been reduced to 12.5 mg daily.  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  He also questions if he needs to remain on Brilinta.        Additionally, he tells me that instead of  taking atorvastatin 20 mg daily.  He is only taking 10 mg daily.  We reviewed his most recent lipid profile from early November.  His LDL at that time was 85, which is up compared to where it was earlier this year.  His HDL remains excellent at 57.      CURRENT CARDIAC MEDICATIONS:   1.  Aspirin 81 mg daily.   2.  Atorvastatin 10 mg.  He is supposed to be taking 2 tablets daily, but instead is just taking one.  3.  Toprol-XL 25 mg listed as 25 mg in the chart.  The patient states he is taking 12.5.      4.  Sublingual nitro p.r.n.   5.  Brilinta 90 mg b.i.d.      The remainder of his medications, allergies and review of systems were reviewed and as are documented separately.      PHYSICAL EXAMINATION:   GENERAL:  The patient is a pleasant 44-year-old gentleman who appears his stated age.  He is in no apparent distress.   VITAL SIGNS:  His blood pressure is 110/74, pulse 72, weight is 156 pounds, which is overall stable.   PULMONARY:  Breathing is nonlabored.  Lungs are clear to auscultation.   CARDIAC:  Reveals a regular rate and rhythm.  I do not appreciate any significant murmur.   EXTREMITIES:  Lower extremities show no evidence of edema.   NEUROLOGIC:  Alert and oriented.      ASSESSMENT AND PLAN:  Mr. Duong is a 44-year-old gentleman with a history of dyslipidemia, strong family history of premature coronary artery disease and known coronary artery disease with history of a non-STEMI in 10/2018, at which time he underwent drug-eluting stent placement x2 to the circumflex/OM3.  He has a mild ischemic cardiomyopathy with an EF in the low normal range at 50%-55%.  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  Overall, he seems to be doing well from a cardiovascular standpoint.        He does mention with activity, he does feel a bit of lightheadedness and shortness of breath.  He feels that this is  likely from his metoprolol as he was having similar symptoms on a higher dose of metoprolol in the past.  Additionally, he is concerned that he may be having some male sexual dysfunction from the medication.  He is interested in stopping the medication.  He is on a low dose and I have told him that I think it is okay if he tries stopping.  If his exertional symptoms do continue on or certainly if they progress with the discontinuation of the medication, I have asked him to contact us within the next couple of weeks, as I would want to set him follow him up after stress testing if this is the case.        He also is interested in discontinuing his Brilinta, although he states he just picked up a 3-month supply within the last week and is wanting to complete out his current bottle before discontinuation.  I think that this is excellent plan, as it will give him time to see how he does off the metoprolol before considering discontinuing the Brilinta.  As long as he is not having any exertional symptoms after discontinuing the metoprolol, I am agreeable with him discontinuing the Brilinta after he finishes his current bottle.        In regard to his hyperlipidemia, his most recent LDL actually was up a bit from what we saw previously.  The patient states that he is only taking 10 mg of atorvastatin.  I highly encouraged him to take 20 mg and he is going to think about this.      At this point, we will plan to see him back in the Cardiology office in approximately 1 year.  Of course, I did encourage him to contact us sooner with any questions or concerns or if he has ongoing symptomatology after discontinuing the metoprolol.         EDWARDO GATES PA-C             D: 2019   T: 2019   MT: NELLY      Name:     JESSICA ST   MRN:      [id number]        Account:      [id number]   :      1975           Service Date: 2019      Document: [id number]           Outpatient Encounter Medications as  of 12/12/2019   Medication Sig Dispense Refill     acetaminophen 500 MG CAPS Take by mouth as needed       aspirin 81 MG tablet Take 81 mg by mouth daily       atorvastatin (LIPITOR) 10 MG tablet TAKE 2 TABLETS EVERY EVENING (Patient taking differently: 10 mg Pt taking 10 mg daily) 180 tablet 0     Cholecalciferol (VITAMIN D) 2000 units tablet Take 1 tablet by mouth every other day       magnesium 500 MG TABS Take by mouth daily       nitroGLYcerin (NITROSTAT) 0.4 MG sublingual tablet For chest pain place 1 tablet under the tongue every 5 minutes for 3 doses. If symptoms persist 5 minutes after 1st dose call 911. 25 tablet 1     ticagrelor (BRILINTA) 90 MG tablet Take 1 tablet (90 mg) by mouth 2 times daily 180 tablet 0     [DISCONTINUED] colchicine (COLCYRS) 0.6 MG tablet Take 1 tablet (0.6 mg) by mouth daily (Patient not taking: Reported on 3/22/2019) 28 tablet 0     [DISCONTINUED] metoprolol succinate ER (TOPROL-XL) 25 MG 24 hr tablet Take 1 tablet (25 mg) by mouth daily 180 tablet 0     No facility-administered encounter medications on file as of 12/12/2019.              Again, thank you for allowing me to participate in the care of your patient.      Sincerely,    Uma Longo PA-C     [facility]

## 2019-12-12 NOTE — PATIENT INSTRUCTIONS
Thank you for your [facility] visit today. Your provider has recommended the following:  Medication Changes:  Ok to stop the metoprolol  Finish the Brilinta then you can stop  I would recommend that you take both (20mg) of the atorvastatin  Recommendations:  Please let us know if your symptoms do not get better in the next 1-2 weeks off of Brilinta  Follow-up:  See Dr Sorenson for cardiology follow up in 1 year.    Reminder:  Please bring in your current medication list or your medication, over the counter supplements and vitamin bottles as we will review these at each office visit.

## 2019-12-12 NOTE — PROGRESS NOTES
Service Date: 12/12/2019      HISTORY OF PRESENTING COMPLAINT:  Mr. Duong is a pleasant 44-year-old gentleman who presents to the office today for routine followup.      His cardiovascular history includes dyslipidemia, a family history of premature coronary artery disease and a history of an acute inferolateral MI in 09/2018, at which time he presented with several days of chest burning left shoulder and arm discomfort and shortness of breath.  Coronary angiography demonstrated a culprit lesion in the circumflex and severe disease in an OM3.  He underwent drug-eluting stent placement x2.  He was found to have mild to moderate disease in the remainder of the coronary tree.  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  Conservative management was recommended as the patient was not having any symptoms of claudication.  Following his MI, he was left with a mild ischemic cardiomyopathy.  His last echocardiogram in late 11/2018 showed an EF in the 50%-55% range.  Dr. Sorenson did review the echocardiogram and felt that there was some inferior/inferolateral wall hypokinesis.      The patient's main concern today is about potential side effects from metoprolol.  He states that early on, he was on 50 mg of metoprolol and had begun feeling lightheaded, hypotensive and short of breath when working out, and the dose was initially reduced to 25 mg daily.  It has subsequently been reduced to 12.5 mg daily.  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  He also questions if he needs to remain on Brilinta.        Additionally, he tells me that instead of taking atorvastatin 20 mg daily.  He is only taking 10 mg daily.  We reviewed his most recent lipid profile from early November.  His LDL at that time was 85, which is up compared to where it was earlier this year.   His HDL remains excellent at 57.      CURRENT CARDIAC MEDICATIONS:   1.  Aspirin 81 mg daily.   2.  Atorvastatin 10 mg.  He is supposed to be taking 2 tablets daily, but instead is just taking one.  3.  Toprol-XL 25 mg listed as 25 mg in the chart.  The patient states he is taking 12.5.      4.  Sublingual nitro p.r.n.   5.  Brilinta 90 mg b.i.d.      The remainder of his medications, allergies and review of systems were reviewed and as are documented separately.      PHYSICAL EXAMINATION:   GENERAL:  The patient is a pleasant 44-year-old gentleman who appears his stated age.  He is in no apparent distress.   VITAL SIGNS:  His blood pressure is 110/74, pulse 72, weight is 156 pounds, which is overall stable.   PULMONARY:  Breathing is nonlabored.  Lungs are clear to auscultation.   CARDIAC:  Reveals a regular rate and rhythm.  I do not appreciate any significant murmur.   EXTREMITIES:  Lower extremities show no evidence of edema.   NEUROLOGIC:  Alert and oriented.      ASSESSMENT AND PLAN:  Mr. Duong is a 44-year-old gentleman with a history of dyslipidemia, strong family history of premature coronary artery disease and known coronary artery disease with history of a non-STEMI in 10/2018, at which time he underwent drug-eluting stent placement x2 to the circumflex/OM3.  He has a mild ischemic cardiomyopathy with an EF in the low normal range at 50%-55%.  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  Overall, he seems to be doing well from a cardiovascular standpoint.        He does mention with activity, he does feel a bit of lightheadedness and shortness of breath.  He feels that this is likely from his metoprolol as he was having similar symptoms on a higher dose of metoprolol in the past.  Additionally, he is concerned that he may be having some male sexual dysfunction from the medication.  He is  interested in stopping the medication.  He is on a low dose and I have told him that I think it is okay if he tries stopping.  If his exertional symptoms do continue on or certainly if they progress with the discontinuation of the medication, I have asked him to contact us within the next couple of weeks, as I would want to set him follow him up after stress testing if this is the case.        He also is interested in discontinuing his Brilinta, although he states he just picked up a 3-month supply within the last week and is wanting to complete out his current bottle before discontinuation.  I think that this is excellent plan, as it will give him time to see how he does off the metoprolol before considering discontinuing the Brilinta.  As long as he is not having any exertional symptoms after discontinuing the metoprolol, I am agreeable with him discontinuing the Brilinta after he finishes his current bottle.        In regard to his hyperlipidemia, his most recent LDL actually was up a bit from what we saw previously.  The patient states that he is only taking 10 mg of atorvastatin.  I highly encouraged him to take 20 mg and he is going to think about this.      At this point, we will plan to see him back in the Cardiology office in approximately 1 year.  Of course, I did encourage him to contact us sooner with any questions or concerns or if he has ongoing symptomatology after discontinuing the metoprolol.         EDWARDO GATES PA-C             D: 2019   T: 2019   MT: NELLY      Name:     JESSICA ST   MRN:      [id number]        Account:      [id number]   :      1975           Service Date: 2019      Document: [id number]

## 2019-12-12 NOTE — PROGRESS NOTES
Please see separate dictation for HPI, PHYSICAL EXAM AND IMPRESSION/PLAN.    CURRENT MEDICATIONS:  Current Outpatient Medications   Medication Sig Dispense Refill     acetaminophen 500 MG CAPS Take by mouth as needed       aspirin 81 MG tablet Take 81 mg by mouth daily       atorvastatin (LIPITOR) 10 MG tablet TAKE 2 TABLETS EVERY EVENING (Patient taking differently: 10 mg Pt taking 10 mg daily) 180 tablet 0     Cholecalciferol (VITAMIN D) 2000 units tablet Take 1 tablet by mouth every other day       magnesium 500 MG TABS Take by mouth daily       metoprolol succinate ER (TOPROL-XL) 25 MG 24 hr tablet Take 1 tablet (25 mg) by mouth daily 180 tablet 0     nitroGLYcerin (NITROSTAT) 0.4 MG sublingual tablet For chest pain place 1 tablet under the tongue every 5 minutes for 3 doses. If symptoms persist 5 minutes after 1st dose call 911. 25 tablet 1     ticagrelor (BRILINTA) 90 MG tablet Take 1 tablet (90 mg) by mouth 2 times daily 180 tablet 0       ALLERGIES:   No Known Allergies    PAST MEDICAL HISTORY:  Past Medical History:   Diagnosis Date     ACS (acute coronary syndrome) (H)      CHF (congestive heart failure) (H)      Glucose intolerance      Hyperlipidemia      Myocardial infarct (H)        PAST SURGICAL HISTORY:  Past Surgical History:   Procedure Laterality Date     NO HISTORY OF SURGERY         SOCIAL HISTORY:  Social History     Socioeconomic History     Marital status:      Spouse name: None     Number of children: None     Years of education: None     Highest education level: None   Occupational History     None   Social Needs     Financial resource strain: None     Food insecurity:     Worry: None     Inability: None     Transportation needs:     Medical: None     Non-medical: None   Tobacco Use     Smoking status: Never Smoker     Smokeless tobacco: Never Used   Substance and Sexual Activity     Alcohol use: No     Drug use: No     Sexual activity: Yes     Partners: Female   Lifestyle      Physical activity:     Days per week: None     Minutes per session: None     Stress: None   Relationships     Social connections:     Talks on phone: None     Gets together: None     Attends [employer] service: None     Active member of club or organization: None     Attends meetings of clubs or organizations: None     Relationship status: None     Intimate partner violence:     Fear of current or ex partner: None     Emotionally abused: None     Physically abused: None     Forced sexual activity: None   Other Topics Concern     Parent/sibling w/ CABG, MI or angioplasty before 65F 55M? Not Asked   Social History Narrative     None       FAMILY HISTORY:  Family History   Problem Relation Age of Onset     Coronary Artery Disease Father      Myocardial Infarction Brother        Review of Systems:  Skin:  Negative for       Eyes:  Positive for   reading glasses  ENT:  Negative for      Respiratory:  Positive for shortness of breath     Cardiovascular:    fatigue;lightheadedness;Positive for    Gastroenterology: Positive for heartburn    Genitourinary:  not assessed      Musculoskeletal:  Negative for      Neurologic:  Positive for headaches    Psychiatric:  Positive for sleep disturbances    Heme/Lymph/Imm:  Negative      Endocrine:  Positive for   prediabetic     Reviewed. Remainder of the note dictated.    Uma Longo PA-C

## 2019-12-12 NOTE — LETTER
12/12/2019    Izabela Keller MD  [address] [facility] [place] [address]    RE: Neptali Duong       Dear Colleague,    I had the pleasure of seeing Neptali Duong in the [facility].    Please see separate dictation for HPI, PHYSICAL EXAM AND IMPRESSION/PLAN.    CURRENT MEDICATIONS:  Current Outpatient Medications   Medication Sig Dispense Refill     acetaminophen 500 MG CAPS Take by mouth as needed       aspirin 81 MG tablet Take 81 mg by mouth daily       atorvastatin (LIPITOR) 10 MG tablet TAKE 2 TABLETS EVERY EVENING (Patient taking differently: 10 mg Pt taking 10 mg daily) 180 tablet 0     Cholecalciferol (VITAMIN D) 2000 units tablet Take 1 tablet by mouth every other day       magnesium 500 MG TABS Take by mouth daily       metoprolol succinate ER (TOPROL-XL) 25 MG 24 hr tablet Take 1 tablet (25 mg) by mouth daily 180 tablet 0     nitroGLYcerin (NITROSTAT) 0.4 MG sublingual tablet For chest pain place 1 tablet under the tongue every 5 minutes for 3 doses. If symptoms persist 5 minutes after 1st dose call 911. 25 tablet 1     ticagrelor (BRILINTA) 90 MG tablet Take 1 tablet (90 mg) by mouth 2 times daily 180 tablet 0       ALLERGIES:   No Known Allergies    PAST MEDICAL HISTORY:  Past Medical History:   Diagnosis Date     ACS (acute coronary syndrome) (H)      CHF (congestive heart failure) (H)      Glucose intolerance      Hyperlipidemia      Myocardial infarct (H)        PAST SURGICAL HISTORY:  Past Surgical History:   Procedure Laterality Date     NO HISTORY OF SURGERY         SOCIAL HISTORY:  Social History     Socioeconomic History     Marital status:      Spouse name: None     Number of children: None     Years of education: None     Highest education level: None   Occupational History     None   Social Needs     Financial resource strain: None     Food insecurity:     Worry: None     Inability: None     Transportation needs:     Medical: None     Non-medical: None    Tobacco Use     Smoking status: Never Smoker     Smokeless tobacco: Never Used   Substance and Sexual Activity     Alcohol use: No     Drug use: No     Sexual activity: Yes     Partners: Female   Lifestyle     Physical activity:     Days per week: None     Minutes per session: None     Stress: None   Relationships     Social connections:     Talks on phone: None     Gets together: None     Attends Confucianism service: None     Active member of club or organization: None     Attends meetings of clubs or organizations: None     Relationship status: None     Intimate partner violence:     Fear of current or ex partner: None     Emotionally abused: None     Physically abused: None     Forced sexual activity: None   Other Topics Concern     Parent/sibling w/ CABG, MI or angioplasty before 65F 55M? Not Asked   Social History Narrative     None       FAMILY HISTORY:  Family History   Problem Relation Age of Onset     Coronary Artery Disease Father      Myocardial Infarction Brother        Review of Systems:  Skin:  Negative for       Eyes:  Positive for   reading glasses  ENT:  Negative for      Respiratory:  Positive for shortness of breath     Cardiovascular:    fatigue;lightheadedness;Positive for    Gastroenterology: Positive for heartburn    Genitourinary:  not assessed      Musculoskeletal:  Negative for      Neurologic:  Positive for headaches    Psychiatric:  Positive for sleep disturbances    Heme/Lymph/Imm:  Negative      Endocrine:  Positive for   prediabetic     Reviewed. Remainder of the note dictated.    Uma Longo PA-C    Service Date: 12/12/2019      HISTORY OF PRESENTING COMPLAINT:  Mr. Duong is a pleasant 44-year-old gentleman who presents to the office today for routine followup.      His cardiovascular history includes dyslipidemia, a family history of premature coronary artery disease and a history of an acute inferolateral MI in 09/2018, at which time he presented with several days of chest  burning left shoulder and arm discomfort and shortness of breath.  Coronary angiography demonstrated a culprit lesion in the circumflex and severe disease in an OM3.  He underwent drug-eluting stent placement x2.  He was found to have mild to moderate disease in the remainder of the coronary tree.  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  Conservative management was recommended as the patient was not having any symptoms of claudication.  Following his MI, he was left with a mild ischemic cardiomyopathy.  His last echocardiogram in late 11/2018 showed an EF in the 50%-55% range.  Dr. Sorenson did review the echocardiogram and felt that there was some inferior/inferolateral wall hypokinesis.      The patient's main concern today is about potential side effects from metoprolol.  He states that early on, he was on 50 mg of metoprolol and had begun feeling lightheaded, hypotensive and short of breath when working out, and the dose was initially reduced to 25 mg daily.  It has subsequently been reduced to 12.5 mg daily.  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  He also questions if he needs to remain on Brilinta.        Additionally, he tells me that instead of taking atorvastatin 20 mg daily.  He is only taking 10 mg daily.  We reviewed his most recent lipid profile from early November.  His LDL at that time was 85, which is up compared to where it was earlier this year.  His HDL remains excellent at 57.      CURRENT CARDIAC MEDICATIONS:   1.  Aspirin 81 mg daily.   2.  Atorvastatin 10 mg.  He is supposed to be taking 2 tablets daily, but instead is just taking one.  3.  Toprol-XL 25 mg listed as 25 mg in the chart.  The patient states he is taking 12.5.      4.  Sublingual nitro p.r.n.   5.  Brilinta 90 mg b.i.d.      The remainder of his  medications, allergies and review of systems were reviewed and as are documented separately.      PHYSICAL EXAMINATION:   GENERAL:  The patient is a pleasant 44-year-old gentleman who appears his stated age.  He is in no apparent distress.   VITAL SIGNS:  His blood pressure is 110/74, pulse 72, weight is 156 pounds, which is overall stable.   PULMONARY:  Breathing is nonlabored.  Lungs are clear to auscultation.   CARDIAC:  Reveals a regular rate and rhythm.  I do not appreciate any significant murmur.   EXTREMITIES:  Lower extremities show no evidence of edema.   NEUROLOGIC:  Alert and oriented.      ASSESSMENT AND PLAN:  Mr. Duong is a 44-year-old gentleman with a history of dyslipidemia, strong family history of premature coronary artery disease and known coronary artery disease with history of a non-STEMI in 10/2018, at which time he underwent drug-eluting stent placement x2 to the circumflex/OM3.  He has a mild ischemic cardiomyopathy with an EF in the low normal range at 50%-55%.  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  Overall, he seems to be doing well from a cardiovascular standpoint.        He does mention with activity, he does feel a bit of lightheadedness and shortness of breath.  He feels that this is likely from his metoprolol as he was having similar symptoms on a higher dose of metoprolol in the past.  Additionally, he is concerned that he may be having some male sexual dysfunction from the medication.  He is interested in stopping the medication.  He is on a low dose and I have told him that I think it is okay if he tries stopping.  If his exertional symptoms do continue on or certainly if they progress with the discontinuation of the medication, I have asked him to contact us within the next couple of weeks, as I would want to set him follow him up after stress testing if  this is the case.        He also is interested in discontinuing his Brilinta, although he states he just picked up a 3-month supply within the last week and is wanting to complete out his current bottle before discontinuation.  I think that this is excellent plan, as it will give him time to see how he does off the metoprolol before considering discontinuing the Brilinta.  As long as he is not having any exertional symptoms after discontinuing the metoprolol, I am agreeable with him discontinuing the Brilinta after he finishes his current bottle.        In regard to his hyperlipidemia, his most recent LDL actually was up a bit from what we saw previously.  The patient states that he is only taking 10 mg of atorvastatin.  I highly encouraged him to take 20 mg and he is going to think about this.      At this point, we will plan to see him back in the Cardiology office in approximately 1 year.  Of course, I did encourage him to contact us sooner with any questions or concerns or if he has ongoing symptomatology after discontinuing the metoprolol.         EDWARDO GATES PA-C             D: 2019   T: 2019   MT: NELLY      Name:     JESSICA ST   MRN:      [id number]        Account:      [id number]   :      1975           Service Date: 2019      Document: [id number]        Thank you for allowing me to participate in the care of your patient.      Sincerely,     Edwardo Gates PA-C     [facility]    cc:   No referring provider defined for this encounter.

## 2019-12-23 DIAGNOSIS — I24.9 ACS (ACUTE CORONARY SYNDROME) (H): ICD-10-CM

## 2019-12-23 RX ORDER — ATORVASTATIN CALCIUM 10 MG/1
TABLET, FILM COATED ORAL
Qty: 180 TABLET | Refills: 0 | Status: SHIPPED | OUTPATIENT
Start: 2019-12-23 | End: 2020-04-22

## 2019-12-23 NOTE — TELEPHONE ENCOUNTER
"Patient due for fasting office visit- 90 days supply given.  Routing to team to schedule appointment     Myrna OLIVIER RN  [facility]  [phone]      Last Written Prescription Date:  7/19/19  Last Fill Quantity: 180,  # refills: 0   Last office visit: 10/23/2018 with prescribing provider:     Future Office Visit:    Requested Prescriptions   Pending Prescriptions Disp Refills     atorvastatin (LIPITOR) 10 MG tablet 180 tablet 0       Statins Protocol Failed - 12/23/2019  2:18 PM        Failed - Recent (12 mo) or future (30 days) visit within the authorizing provider's specialty     Patient has had an office visit with the authorizing provider or a provider within the authorizing providers department within the previous 12 mos or has a future within next 30 days. See \"Patient Info\" tab in inbasket, or \"Choose Columns\" in Meds & Orders section of the refill encounter.              Passed - LDL on file in past 12 months     Recent Labs   Lab Test 11/09/19  1130   LDL 85             Passed - No abnormal creatine kinase in past 12 months     No lab results found.             Passed - Medication is active on med list        Passed - Patient is age 18 or older          "

## 2020-03-11 ENCOUNTER — HEALTH MAINTENANCE LETTER (OUTPATIENT)
Age: 45
End: 2020-03-11

## 2020-04-17 ENCOUNTER — MYC MEDICAL ADVICE (OUTPATIENT)
Dept: INTERNAL MEDICINE | Facility: CLINIC | Age: 45
End: 2020-04-17

## 2020-04-17 DIAGNOSIS — I24.9 ACS (ACUTE CORONARY SYNDROME) (H): ICD-10-CM

## 2020-04-20 ENCOUNTER — MYC MEDICAL ADVICE (OUTPATIENT)
Dept: CARDIOLOGY | Facility: CLINIC | Age: 45
End: 2020-04-20

## 2020-04-20 RX ORDER — ATORVASTATIN CALCIUM 10 MG/1
TABLET, FILM COATED ORAL
Qty: 180 TABLET | Refills: 0 | Status: CANCELLED | OUTPATIENT
Start: 2020-04-20

## 2020-04-20 NOTE — TELEPHONE ENCOUNTER
"Requested Prescriptions   Pending Prescriptions Disp Refills     atorvastatin (LIPITOR) 10 MG tablet 180 tablet 0     Sig: TAKE 2 TABLETS EVERY EVENING       Statins Protocol Failed - 4/20/2020 12:03 PM        Failed - Recent (12 mo) or future (30 days) visit within the authorizing provider's specialty     Patient has had an office visit with the authorizing provider or a provider within the authorizing providers department within the previous 12 mos or has a future within next 30 days. See \"Patient Info\" tab in inbasket, or \"Choose Columns\" in Meds & Orders section of the refill encounter.              Passed - LDL on file in past 12 months     Recent Labs   Lab Test 11/09/19  1130   LDL 85             Passed - No abnormal creatine kinase in past 12 months     No lab results found.             Passed - Medication is active on med list        Passed - Patient is age 18 or older           Sent Strav[employer] message. Advised evisit.   "

## 2020-04-22 ENCOUNTER — MYC MEDICAL ADVICE (OUTPATIENT)
Dept: CARDIOLOGY | Facility: CLINIC | Age: 45
End: 2020-04-22

## 2020-04-22 DIAGNOSIS — I24.9 ACS (ACUTE CORONARY SYNDROME) (H): ICD-10-CM

## 2020-04-22 DIAGNOSIS — E78.5 HYPERLIPIDEMIA LDL GOAL <70: ICD-10-CM

## 2020-04-22 DIAGNOSIS — I21.4 NSTEMI (NON-ST ELEVATED MYOCARDIAL INFARCTION) (H): ICD-10-CM

## 2020-04-22 DIAGNOSIS — I50.22 CHRONIC SYSTOLIC CONGESTIVE HEART FAILURE (H): ICD-10-CM

## 2020-04-22 DIAGNOSIS — I25.10 CORONARY ARTERY DISEASE INVOLVING NATIVE CORONARY ARTERY OF NATIVE HEART WITHOUT ANGINA PECTORIS: ICD-10-CM

## 2020-04-22 DIAGNOSIS — I25.10 CORONARY ARTERY DISEASE INVOLVING NATIVE CORONARY ARTERY OF NATIVE HEART WITHOUT ANGINA PECTORIS: Primary | ICD-10-CM

## 2020-04-22 DIAGNOSIS — Z95.5 S/P CORONARY ARTERY STENT PLACEMENT: ICD-10-CM

## 2020-04-22 DIAGNOSIS — I50.22 CHRONIC SYSTOLIC CONGESTIVE HEART FAILURE (H): Primary | ICD-10-CM

## 2020-04-22 RX ORDER — ATORVASTATIN CALCIUM 10 MG/1
TABLET, FILM COATED ORAL
Qty: 180 TABLET | Refills: 2 | Status: SHIPPED | OUTPATIENT
Start: 2020-04-22 | End: 2020-04-29 | Stop reason: DRUGHIGH

## 2020-04-22 NOTE — TELEPHONE ENCOUNTER
Received refill request for:  Atorvastatin   Last OV was: 12/12/19 w/ Uma Longo PA-C  Labs/EKG: Lipid 11/9/19  F/U scheduled: Orders for annual visit 12/2020  New script sent to: [facility]

## 2020-04-29 RX ORDER — ATORVASTATIN CALCIUM 20 MG/1
20 TABLET, FILM COATED ORAL DAILY
Qty: 90 TABLET | Refills: 2 | Status: SHIPPED | OUTPATIENT
Start: 2020-04-29 | End: 2021-01-27

## 2021-01-03 ENCOUNTER — MYC MEDICAL ADVICE (OUTPATIENT)
Dept: INTERNAL MEDICINE | Facility: CLINIC | Age: 46
End: 2021-01-03

## 2021-01-03 ENCOUNTER — HEALTH MAINTENANCE LETTER (OUTPATIENT)
Age: 46
End: 2021-01-03

## 2021-01-04 NOTE — TELEPHONE ENCOUNTER
Last visit with primary care provider 10/23/18.   Do you want to order labs before appointment?  He would prefer video appt, is that OK?

## 2021-01-21 ENCOUNTER — MYC MEDICAL ADVICE (OUTPATIENT)
Dept: INTERNAL MEDICINE | Facility: CLINIC | Age: 46
End: 2021-01-21

## 2021-01-21 ASSESSMENT — ENCOUNTER SYMPTOMS
FREQUENCY: 0
CHILLS: 0
HEMATOCHEZIA: 0
PARESTHESIAS: 0
DIARRHEA: 0
JOINT SWELLING: 0
WEAKNESS: 0
CONSTIPATION: 0
EYE PAIN: 0
NAUSEA: 0
ABDOMINAL PAIN: 0
HEMATURIA: 0
HEADACHES: 1
ARTHRALGIAS: 0
NERVOUS/ANXIOUS: 0
PALPITATIONS: 0
SORE THROAT: 0
DIZZINESS: 0
FEVER: 0
COUGH: 0
DYSURIA: 0
HEARTBURN: 0
MYALGIAS: 0
SHORTNESS OF BREATH: 0

## 2021-01-27 ENCOUNTER — VIRTUAL VISIT (OUTPATIENT)
Dept: INTERNAL MEDICINE | Facility: CLINIC | Age: 46
End: 2021-01-27
Payer: COMMERCIAL

## 2021-01-27 DIAGNOSIS — Z98.61 STATUS POST PERCUTANEOUS TRANSLUMINAL CORONARY ANGIOPLASTY: ICD-10-CM

## 2021-01-27 DIAGNOSIS — R73.03 PREDIABETES: Primary | ICD-10-CM

## 2021-01-27 DIAGNOSIS — I25.10 CORONARY ARTERY DISEASE INVOLVING NATIVE CORONARY ARTERY OF NATIVE HEART WITHOUT ANGINA PECTORIS: ICD-10-CM

## 2021-01-27 DIAGNOSIS — I21.4 NSTEMI (NON-ST ELEVATED MYOCARDIAL INFARCTION) (H): ICD-10-CM

## 2021-01-27 DIAGNOSIS — I50.22 CHRONIC SYSTOLIC CONGESTIVE HEART FAILURE (H): ICD-10-CM

## 2021-01-27 PROCEDURE — 99214 OFFICE O/P EST MOD 30 MIN: CPT | Performed by: INTERNAL MEDICINE

## 2021-01-27 RX ORDER — NITROGLYCERIN 0.4 MG/1
TABLET SUBLINGUAL
Qty: 25 TABLET | Refills: 1 | Status: CANCELLED | OUTPATIENT
Start: 2021-01-27

## 2021-01-27 RX ORDER — NITROGLYCERIN 400 UG/1
SPRAY ORAL
Qty: 12 G | Refills: 1 | Status: SHIPPED | OUTPATIENT
Start: 2021-01-27

## 2021-01-27 RX ORDER — ATORVASTATIN CALCIUM 20 MG/1
20 TABLET, FILM COATED ORAL DAILY
Qty: 90 TABLET | Refills: 3 | Status: SHIPPED | OUTPATIENT
Start: 2021-01-27

## 2021-01-27 NOTE — PROGRESS NOTES
Neptali is a 45 year old who is being evaluated via a billable video visit.      How would you like to obtain your AVS? MyChart  If the video visit is dropped, the invitation should be resent by: Text to cell phone: [phone]  Will anyone else be joining your video visit? No    Video Start Time: 2:50pm  Assessment & Plan     (R73.03) Prediabetes  (primary encounter diagnosis)  Comment:assess  Plan: **A1C FUTURE anytime            (I50.22) Chronic systolic congestive heart failure (H)  Comment: stable  Plan: atorvastatin (LIPITOR) 20 MG tablet,         Comprehensive metabolic panel (BMP + Alb, Alk         Phos, ALT, AST, Total. Bili, TP), CBC with         platelets and differential            (I25.10) Coronary artery disease involving native coronary artery of native heart without angina pectoris  Comment: assess cholesterol  Plan: Lipid panel reflex to direct LDL Fasting,         atorvastatin (LIPITOR) 20 MG tablet,         nitroGLYcerin (NITROLINGUAL) 0.4 MG/SPRAY spray        -pt is on aspirin    (I21.4) NSTEMI (non-ST elevated myocardial infarction) (H)  Comment:   Plan: atorvastatin (LIPITOR) 20 MG tablet      -f/u with cardiology, as recommended    15  minutes spent on the date of the encounter doing chart review, patient visit and documentation          See Patient Instructions    No follow-ups on file.    Izabela Keller MD  [facility]     Neptali is a 45 year old who presents to clinic today for the following health issues     HPI       Pre-diabetes Follow-up    How often are you checking your blood sugar? One time daily  What time of day are you checking your blood sugars (select all that apply)?  Before meals  Have you had any blood sugars above 200?  No, 110-120 typically  Have you had any blood sugars below 70?  No    What symptoms do you notice when your blood sugar is low?  None    What concerns do you have today about your diabetes? None     Do you have any of  these symptoms? (Select all that apply)  No numbness or tingling in feet.  No redness, sores or blisters on feet.  No complaints of excessive thirst.  No reports of blurry vision.  No significant changes to weight.      BP Readings from Last 2 Encounters:   12/12/19 110/74   03/22/19 106/68     Hemoglobin A1C (%)   Date Value   11/09/2019 5.7 (H)   10/17/2018 5.9 (H)     LDL Cholesterol Calculated (mg/dL)   Date Value   11/09/2019 85   03/25/2019 77       Hyperlipidemia Follow-Up      Are you regularly taking any medication or supplement to lower your cholesterol?   Yes- lipitor    Are you having muscle aches or other side effects that you think could be caused by your cholesterol lowering medication?  No    Vascular Disease/ CHF Follow-up      How often do you take nitroglycerin? Rarely-pt noting that he thinks its more paranoia related    Do you take an aspirin every day? Yes 81 mg    Pt noting no swelling or SOB or weight gain    He is on aspirin and statin    His cardiologist discontinued the beta blocker.      How many servings of fruits and vegetables do you eat daily?  2-3    On average, how many sweetened beverages do you drink each day (Examples: soda, juice, sweet tea, etc.  Do NOT count diet or artificially sweetened beverages)?   0    How many days per week do you exercise enough to make your heart beat faster? 3    How many minutes a day do you exercise enough to make your heart beat faster? 20 - 29    How many days per week do you miss taking your medication? 0        Review of Systems   CONSTITUTIONAL: NEGATIVE for fever, chills, change in weight- denies weight gain  RESP: NEGATIVE for significant cough or SOB  CV: NEGATIVE for chest pain, palpitations or peripheral edema      Objective           Vitals:  No vitals were obtained today due to virtual visit.    Physical Exam   GENERAL: Healthy, alert and no distress  EYES: Eyes grossly normal to inspection.  No discharge or erythema, or obvious  scleral/conjunctival abnormalities.  RESP: No audible wheeze, cough, or visible cyanosis.  No visible retractions or increased work of breathing.    SKIN: Visible skin clear. No significant rash, abnormal pigmentation or lesions.  NEURO: Cranial nerves grossly intact.  Mentation and speech appropriate for age.  PSYCH: Mentation appears normal, affect normal/bright, judgement and insight intact, normal speech and appearance well-groomed.            Video-Visit Details    Type of service:  Video Visit    Video End Time:3:01pm    Originating Location (pt. Location): home    Distant Location (provider location):  [facility]     Platform used for Video Visit: [employer]

## 2021-03-10 ENCOUNTER — MYC MEDICAL ADVICE (OUTPATIENT)
Dept: INTERNAL MEDICINE | Facility: CLINIC | Age: 46
End: 2021-03-10

## 2021-03-23 ENCOUNTER — IMMUNIZATION (OUTPATIENT)
Dept: NURSING | Facility: CLINIC | Age: 46
End: 2021-03-23
Payer: COMMERCIAL

## 2021-03-23 PROCEDURE — 91300 PR COVID VAC PFIZER DIL RECON 30 MCG/0.3 ML IM: CPT

## 2021-03-23 PROCEDURE — 0001A PR COVID VAC PFIZER DIL RECON 30 MCG/0.3 ML IM: CPT

## 2021-04-13 ENCOUNTER — IMMUNIZATION (OUTPATIENT)
Dept: NURSING | Facility: CLINIC | Age: 46
End: 2021-04-13
Attending: INTERNAL MEDICINE
Payer: COMMERCIAL

## 2021-04-13 PROCEDURE — 91300 PR COVID VAC PFIZER DIL RECON 30 MCG/0.3 ML IM: CPT

## 2021-04-13 PROCEDURE — 0002A PR COVID VAC PFIZER DIL RECON 30 MCG/0.3 ML IM: CPT

## 2021-04-25 ENCOUNTER — HEALTH MAINTENANCE LETTER (OUTPATIENT)
Age: 46
End: 2021-04-25

## 2021-08-27 ENCOUNTER — LAB (OUTPATIENT)
Dept: LAB | Facility: CLINIC | Age: 46
End: 2021-08-27
Payer: COMMERCIAL

## 2021-08-27 DIAGNOSIS — R73.03 PREDIABETES: ICD-10-CM

## 2021-08-27 DIAGNOSIS — I25.10 CORONARY ARTERY DISEASE INVOLVING NATIVE CORONARY ARTERY OF NATIVE HEART WITHOUT ANGINA PECTORIS: ICD-10-CM

## 2021-08-27 DIAGNOSIS — I50.22 CHRONIC SYSTOLIC CONGESTIVE HEART FAILURE (H): ICD-10-CM

## 2021-08-27 LAB
BASOPHILS # BLD AUTO: 0.1 10E3/UL (ref 0–0.2)
BASOPHILS NFR BLD AUTO: 1 %
EOSINOPHIL # BLD AUTO: 0.2 10E3/UL (ref 0–0.7)
EOSINOPHIL NFR BLD AUTO: 4 %
ERYTHROCYTE [DISTWIDTH] IN BLOOD BY AUTOMATED COUNT: 13 % (ref 10–15)
HBA1C MFR BLD: 5.8 % (ref 0–5.6)
HCT VFR BLD AUTO: 41.3 % (ref 40–53)
HGB BLD-MCNC: 13.5 G/DL (ref 13.3–17.7)
LYMPHOCYTES # BLD AUTO: 1.5 10E3/UL (ref 0.8–5.3)
LYMPHOCYTES NFR BLD AUTO: 27 %
MCH RBC QN AUTO: 29.7 PG (ref 26.5–33)
MCHC RBC AUTO-ENTMCNC: 32.7 G/DL (ref 31.5–36.5)
MCV RBC AUTO: 91 FL (ref 78–100)
MONOCYTES # BLD AUTO: 0.4 10E3/UL (ref 0–1.3)
MONOCYTES NFR BLD AUTO: 8 %
NEUTROPHILS # BLD AUTO: 3.4 10E3/UL (ref 1.6–8.3)
NEUTROPHILS NFR BLD AUTO: 61 %
PLATELET # BLD AUTO: 159 10E3/UL (ref 150–450)
RBC # BLD AUTO: 4.54 10E6/UL (ref 4.4–5.9)
WBC # BLD AUTO: 5.5 10E3/UL (ref 4–11)

## 2021-08-27 PROCEDURE — 36415 COLL VENOUS BLD VENIPUNCTURE: CPT

## 2021-08-27 PROCEDURE — 80053 COMPREHEN METABOLIC PANEL: CPT

## 2021-08-27 PROCEDURE — 85025 COMPLETE CBC W/AUTO DIFF WBC: CPT

## 2021-08-27 PROCEDURE — 83036 HEMOGLOBIN GLYCOSYLATED A1C: CPT

## 2021-08-27 PROCEDURE — 80061 LIPID PANEL: CPT

## 2021-08-28 LAB
ALBUMIN SERPL-MCNC: 4.3 G/DL (ref 3.4–5)
ALP SERPL-CCNC: 56 U/L (ref 40–150)
ALT SERPL W P-5'-P-CCNC: 39 U/L (ref 0–70)
ANION GAP SERPL CALCULATED.3IONS-SCNC: 5 MMOL/L (ref 3–14)
AST SERPL W P-5'-P-CCNC: 24 U/L (ref 0–45)
BILIRUB SERPL-MCNC: 0.8 MG/DL (ref 0.2–1.3)
BUN SERPL-MCNC: 9 MG/DL (ref 7–30)
CALCIUM SERPL-MCNC: 9 MG/DL (ref 8.5–10.1)
CHLORIDE BLD-SCNC: 106 MMOL/L (ref 94–109)
CHOLEST SERPL-MCNC: 153 MG/DL
CO2 SERPL-SCNC: 25 MMOL/L (ref 20–32)
CREAT SERPL-MCNC: 1.09 MG/DL (ref 0.66–1.25)
FASTING STATUS PATIENT QL REPORTED: YES
GFR SERPL CREATININE-BSD FRML MDRD: 80 ML/MIN/1.73M2
GLUCOSE BLD-MCNC: 94 MG/DL (ref 70–99)
HDLC SERPL-MCNC: 54 MG/DL
LDLC SERPL CALC-MCNC: 81 MG/DL
NONHDLC SERPL-MCNC: 99 MG/DL
POTASSIUM BLD-SCNC: 4.5 MMOL/L (ref 3.4–5.3)
PROT SERPL-MCNC: 7.1 G/DL (ref 6.8–8.8)
SODIUM SERPL-SCNC: 136 MMOL/L (ref 133–144)
TRIGL SERPL-MCNC: 91 MG/DL

## 2021-10-10 ENCOUNTER — HEALTH MAINTENANCE LETTER (OUTPATIENT)
Age: 46
End: 2021-10-10

## 2022-02-22 ENCOUNTER — APPOINTMENT (OUTPATIENT)
Dept: INTERPRETER SERVICES | Facility: CLINIC | Age: 47
End: 2022-02-22
Payer: COMMERCIAL

## 2022-05-21 ENCOUNTER — HEALTH MAINTENANCE LETTER (OUTPATIENT)
Age: 47
End: 2022-05-21

## 2022-09-18 ENCOUNTER — HEALTH MAINTENANCE LETTER (OUTPATIENT)
Age: 47
End: 2022-09-18

## 2023-05-27 NOTE — PROGRESS NOTES
Please see separate dictation for HPI, PHYSICAL EXAM AND IMPRESSION/PLAN.    CURRENT MEDICATIONS:  Current Outpatient Prescriptions   Medication Sig Dispense Refill     acetaminophen 500 MG CAPS Take by mouth as needed       aspirin 81 MG tablet Take 81 mg by mouth daily       atorvastatin (LIPITOR) 10 MG tablet Take 2 tablets (20 mg) by mouth every evening 90 tablet 0     Cholecalciferol (VITAMIN D) 2000 units tablet Take 1 tablet by mouth every other day       colchicine (COLCYRS) 0.6 MG tablet Take 1 tablet (0.6 mg) by mouth 2 times daily for 14 days 28 tablet 0     magnesium 500 MG TABS Take by mouth daily       metoprolol succinate (TOPROL-XL) 25 MG 24 hr tablet Take 1 tablet (25 mg) by mouth 2 times daily 180 tablet 0     ticagrelor (BRILINTA) 90 MG tablet Take 1 tablet (90 mg) by mouth 2 times daily 180 tablet 0       ALLERGIES:   No Known Allergies    PAST MEDICAL HISTORY:  Past Medical History:   Diagnosis Date     ACS (acute coronary syndrome) (H)      CHF (congestive heart failure) (H)      Glucose intolerance      Hyperlipidemia      Myocardial infarct (H)        PAST SURGICAL HISTORY:  Past Surgical History:   Procedure Laterality Date     NO HISTORY OF SURGERY         SOCIAL HISTORY:  Social History     Social History     Marital status:      Spouse name: N/A     Number of children: N/A     Years of education: N/A     Social History Main Topics     Smoking status: Never Smoker     Smokeless tobacco: Never Used     Alcohol use No     Drug use: No     Sexual activity: Yes     Partners: Female     Other Topics Concern     None     Social History Narrative       FAMILY HISTORY:  Family History   Problem Relation Age of Onset     Coronary Artery Disease Father      Myocardial Infarction Brother        Review of Systems:  Skin:  Negative       Eyes:  Positive for   reading glasses  ENT:  Negative      Respiratory:  Positive for dyspnea on exertion     Cardiovascular:    Positive for;fatigue   Presents with increased LE edema with bullae bilateral lower extremities  Not on diuretics at baseline and no echo on file since 2012   Likely 2/2 recent surgical procedure and sitting with legs in dependent position  · Echo without any significant findings, BNP normal  · LE venous duplex negative  · Elevate lower extremities  · Wound care consulted   Gastroenterology: Negative      Genitourinary:  Negative      Musculoskeletal:  Negative      Neurologic:  Positive for headaches    Psychiatric:  Negative      Heme/Lymph/Imm:  Negative      Endocrine:  Positive for   prediabetic     Reviewed. Remainder of the note dictated.    Uma Longo PA-C

## 2023-06-04 ENCOUNTER — HEALTH MAINTENANCE LETTER (OUTPATIENT)
Age: 48
End: 2023-06-04

## 2024-07-14 ENCOUNTER — HEALTH MAINTENANCE LETTER (OUTPATIENT)
Age: 49
End: 2024-07-14

## (undated) RX ORDER — LIDOCAINE HYDROCHLORIDE 10 MG/ML
INJECTION, SOLUTION EPIDURAL; INFILTRATION; INTRACAUDAL; PERINEURAL
Status: DISPENSED
Start: 2018-10-18

## (undated) RX ORDER — NITROGLYCERIN 5 MG/ML
VIAL (ML) INTRAVENOUS
Status: DISPENSED
Start: 2018-10-18

## (undated) RX ORDER — HEPARIN SODIUM 1000 [USP'U]/ML
INJECTION, SOLUTION INTRAVENOUS; SUBCUTANEOUS
Status: DISPENSED
Start: 2018-10-18

## (undated) RX ORDER — FENTANYL CITRATE 50 UG/ML
INJECTION, SOLUTION INTRAMUSCULAR; INTRAVENOUS
Status: DISPENSED
Start: 2018-10-18